# Patient Record
Sex: FEMALE | Race: WHITE | NOT HISPANIC OR LATINO | Employment: FULL TIME
[De-identification: names, ages, dates, MRNs, and addresses within clinical notes are randomized per-mention and may not be internally consistent; named-entity substitution may affect disease eponyms.]

---

## 2017-07-01 ENCOUNTER — HEALTH MAINTENANCE LETTER (OUTPATIENT)
Age: 57
End: 2017-07-01

## 2021-05-22 ENCOUNTER — OFFICE VISIT - HEALTHEAST (OUTPATIENT)
Dept: FAMILY MEDICINE | Facility: CLINIC | Age: 61
End: 2021-05-22

## 2021-05-22 DIAGNOSIS — R21 SKIN RASH: ICD-10-CM

## 2021-06-01 ENCOUNTER — OFFICE VISIT (OUTPATIENT)
Dept: ALLERGY | Facility: CLINIC | Age: 61
End: 2021-06-01
Payer: COMMERCIAL

## 2021-06-01 VITALS
OXYGEN SATURATION: 96 % | DIASTOLIC BLOOD PRESSURE: 105 MMHG | HEART RATE: 74 BPM | WEIGHT: 148.81 LBS | TEMPERATURE: 98.5 F | SYSTOLIC BLOOD PRESSURE: 146 MMHG

## 2021-06-01 DIAGNOSIS — L30.9 DERMATITIS: Primary | ICD-10-CM

## 2021-06-01 DIAGNOSIS — J30.0 VASOMOTOR RHINITIS: ICD-10-CM

## 2021-06-01 PROCEDURE — 86003 ALLG SPEC IGE CRUDE XTRC EA: CPT | Performed by: ALLERGY & IMMUNOLOGY

## 2021-06-01 PROCEDURE — 82785 ASSAY OF IGE: CPT | Performed by: ALLERGY & IMMUNOLOGY

## 2021-06-01 PROCEDURE — 99244 OFF/OP CNSLTJ NEW/EST MOD 40: CPT | Performed by: ALLERGY & IMMUNOLOGY

## 2021-06-01 PROCEDURE — 36415 COLL VENOUS BLD VENIPUNCTURE: CPT | Performed by: ALLERGY & IMMUNOLOGY

## 2021-06-01 RX ORDER — GINKGO BILOBA 40 MG
CAPSULE ORAL
COMMUNITY
Start: 2020-09-10

## 2021-06-01 RX ORDER — AZELASTINE 1 MG/ML
2 SPRAY, METERED NASAL 2 TIMES DAILY PRN
Qty: 30 ML | Refills: 3 | Status: SHIPPED | OUTPATIENT
Start: 2021-06-01

## 2021-06-01 SDOH — ECONOMIC STABILITY: FOOD INSECURITY: WITHIN THE PAST 12 MONTHS, YOU WORRIED THAT YOUR FOOD WOULD RUN OUT BEFORE YOU GOT THE MONEY TO BUY MORE.: NOT ASKED

## 2021-06-01 SDOH — ECONOMIC STABILITY: FOOD INSECURITY: HOW HARD IS IT FOR YOU TO PAY FOR THE VERY BASICS LIKE FOOD, HOUSING, MEDICAL CARE, AND HEATING?: NOT ASKED

## 2021-06-01 SDOH — ECONOMIC STABILITY: FOOD INSECURITY: WITHIN THE PAST 12 MONTHS, THE FOOD YOU BOUGHT JUST DIDN'T LAST AND YOU DIDN'T HAVE MONEY TO GET MORE.: NOT ASKED

## 2021-06-01 SDOH — ECONOMIC STABILITY: TRANSPORTATION INSECURITY
IN THE PAST 12 MONTHS, HAS LACK OF TRANSPORTATION KEPT YOU FROM MEETINGS, WORK, OR FROM GETTING THINGS NEEDED FOR DAILY LIVING?: NOT ASKED

## 2021-06-01 SDOH — ECONOMIC STABILITY: FOOD INSECURITY: WITHIN THE PAST 12 MONTHS, YOU WORRIED THAT YOUR FOOD WOULD RUN OUT BEFORE YOU GOT MONEY TO BUY MORE.: NOT ASKED

## 2021-06-01 SDOH — ECONOMIC STABILITY: TRANSPORTATION INSECURITY
IN THE PAST 12 MONTHS, HAS LACK OF TRANSPORTATION KEPT YOU FROM MEDICAL APPOINTMENTS OR FROM GETTING MEDICATIONS?: NOT ASKED

## 2021-06-01 SDOH — ECONOMIC STABILITY: INCOME INSECURITY: HOW HARD IS IT FOR YOU TO PAY FOR THE VERY BASICS LIKE FOOD, HOUSING, MEDICAL CARE, AND HEATING?: NOT ASKED

## 2021-06-01 SDOH — ECONOMIC STABILITY: TRANSPORTATION INSECURITY
IN THE PAST 12 MONTHS, HAS THE LACK OF TRANSPORTATION KEPT YOU FROM MEDICAL APPOINTMENTS OR FROM GETTING MEDICATIONS?: NOT ASKED

## 2021-06-01 ASSESSMENT — ENCOUNTER SYMPTOMS
EYE ITCHING: 0
JOINT SWELLING: 0
SINUS PRESSURE: 0
CHEST TIGHTNESS: 0
EYE REDNESS: 0
RHINORRHEA: 0
SHORTNESS OF BREATH: 0
FEVER: 0
EYE DISCHARGE: 0
MYALGIAS: 0
CHILLS: 0
NAUSEA: 0
VOMITING: 0
ADENOPATHY: 0
ARTHRALGIAS: 0
HEADACHES: 0
DIARRHEA: 0
WHEEZING: 0
FACIAL SWELLING: 0
ACTIVITY CHANGE: 0
COUGH: 0

## 2021-06-01 ASSESSMENT — ACTIVITIES OF DAILY LIVING (ADL): LACK_OF_TRANSPORTATION: NOT ASKED

## 2021-06-01 NOTE — PROGRESS NOTES
SUBJECTIVE:                                                                   Viridiana Payan is a 60-year-old female who presents today to our Allergy Clinic at [facility]; she is being seen at the request of Thao Lane CNP, for skin test evaluation.   The patient reports a history of seasonal rhinoconjunctivitis symptoms in Spring and Fall, manifested by nasal congestion, sneezing, clear rhinorrhea, and itchy/watery eyes.  She takes Claritin-D when her symptoms are bad enough.  That seems to be partially helpful those days.  Otherwise, she takes cetirizine, and it seems to help most of the time.  She used to develop bronchitis once a year, but after starting antihistamines in 1990s, she stopped having recurrent bronchitis.  In March, she developed a rash on her torso and lower extremities.  It was very pruritic.  2 days to get better.  She attributes the rash to polyester and a new comforter.  Replacing the comforter did not help because of containing polyester 2.  These days, she is rash-free, but she develops pruritus of the skin each time she touches anything that contains polyester, primarily clothes.  These days, she tries to wear cotton clothes only.      Patient Active Problem List   Diagnosis   (none) - all problems resolved or deleted       History reviewed. No pertinent past medical history.   Problem (# of Occurrences) Relation (Name,Age of Onset)    Allergies (1) Father: Seasonal    Chronic Obstructive Pulmonary Disease (1) Father        History reviewed. No pertinent surgical history.  Social History     Socioeconomic History     Marital status: Single     Spouse name: None     Number of children: None     Years of education: None     Highest education level: None   Occupational History     Employer: [employer]   Social LendingStandard     Financial resource strain: None     Food insecurity     Worry: None     Inability: None     Transportation needs     Medical: None      Non-medical: None   Tobacco Use     Smoking status: Former Smoker     Packs/day: 1.00     Types: Cigarettes     Quit date:      Years since quittin.4     Smokeless tobacco: Never Used   Substance and Sexual Activity     Alcohol use: None     Drug use: None     Sexual activity: None   Lifestyle     Physical activity     Days per week: None     Minutes per session: None     Stress: None   Relationships     Social connections     Talks on phone: None     Gets together: None     Attends Faith service: None     Active member of club or organization: None     Attends meetings of clubs or organizations: None     Relationship status: None     Intimate partner violence     Fear of current or ex partner: None     Emotionally abused: None     Physically abused: None     Forced sexual activity: None   Other Topics Concern     None   Social History Narrative    2021    ENVIRONMENTAL HISTORY: [place] The home is heated with a baseboard. They do have central air conditioning. The patient's bedroom is furnished with feather bedding or pillows, hard bettina in bedroom, fabric window coverings, and animals sleep in bedroom.  Pets inside the house include 1 dog(s). There is no history of cockroach or mice infestation. There is/are 0 smokers in the house.  The house does not have a damp basement.            Review of Systems   Constitutional: Negative for activity change, chills and fever.   HENT: Negative for congestion, dental problem, ear pain, facial swelling, nosebleeds, postnasal drip, rhinorrhea, sinus pressure and sneezing.    Eyes: Negative for discharge, redness and itching.   Respiratory: Negative for cough, chest tightness, shortness of breath and wheezing.    Cardiovascular: Negative for chest pain.   Gastrointestinal: Negative for diarrhea, nausea and vomiting.   Musculoskeletal: Negative for arthralgias, joint swelling and myalgias.   Skin: Negative for  rash.   Neurological: Negative for headaches.   Hematological: Negative for adenopathy.   Psychiatric/Behavioral: Negative for behavioral problems and self-injury.           Current Outpatient Medications:      azelastine (ASTELIN) 0.1 % nasal spray, Spray 2 sprays into both nostrils 2 times daily as needed for rhinitis, Disp: 30 mL, Rfl: 3     Ginkgo Biloba Extract 40 MG CAPS, , Disp: , Rfl:      Iodine, Kelp, (KELP PO), Take by mouth daily, Disp: , Rfl:      Multiple Vitamins-Minerals (HAIR VITAMINS PO), Take by mouth daily, Disp: , Rfl:      Omega-3 Fatty Acids (FISH OIL PO), Take by mouth daily, Disp: , Rfl:      TURMERIC PO, Take by mouth daily, Disp: , Rfl:   Immunization History   Administered Date(s) Administered     DTaP, Unspecified 10/10/2007, 08/31/2020     Allergies   Allergen Reactions     Clindamycin Hives     Codeine Itching     Fluticasone Other (See Comments)     Nose bleeds     Latex Other (See Comments)     Skin burning      Penicillins      Propoxyphene      Sulfa Drugs      OBJECTIVE:                                                                 BP (!) 146/105 (BP Location: Left arm, Patient Position: Sitting, Cuff Size: Adult Regular)   Pulse 74   Temp 98.5  F (36.9  C) (Tympanic)   Wt 67.5 kg (148 lb 13 oz)   SpO2 96%         Physical Exam  Vitals signs and nursing note reviewed.   Constitutional:       General: She is not in acute distress.     Appearance: She is not diaphoretic.   HENT:      Head: Normocephalic and atraumatic.      Right Ear: Tympanic membrane, ear canal and external ear normal.      Left Ear: Tympanic membrane, ear canal and external ear normal.      Nose: No mucosal edema or rhinorrhea.      Mouth/Throat:      Mouth: Mucous membranes are moist.      Pharynx: No oropharyngeal exudate.   Eyes:      General:         Right eye: No discharge.         Left eye: No discharge.      Conjunctiva/sclera: Conjunctivae normal.   Neck:      Musculoskeletal: Normal range of  motion.   Cardiovascular:      Rate and Rhythm: Normal rate and regular rhythm.      Heart sounds: Normal heart sounds. No murmur.   Pulmonary:      Effort: Pulmonary effort is normal. No respiratory distress.      Breath sounds: Normal breath sounds. No wheezing or rales.   Musculoskeletal: Normal range of motion.   Skin:     General: Skin is warm.      Findings: No rash.      Comments: Toe and fingernails are thickened/dystrophic.  Seems to be consistent with onychomycosis.   Neurological:      Mental Status: She is alert and oriented to person, place, and time.   Psychiatric:         Mood and Affect: Mood normal.         Behavior: Behavior normal.           ASSESSMENT/PLAN:    Dermatitis  The patient has some pictures of the rash.  The quality of the pictures is subpar, but looking and the pictures on her feet, it seems that the patient had contact dermatitis.  Other possibility is id reaction due to onychomycosis.  -I referred the patient to Dermatology for patch testing.  Recommend onychomycosis management by PCP, Dermatology, or Podiatry.   - ADULT DERMATOLOGY REFERRAL    Vasomotor rhinitis  Unable to perform SPT for aeroallergens today because the patient has not stopped oral antihistamines.  -Ordered serum IgE for regional aeroallergen panel.  -She can take cetirizine as needed, and add azelastine 2 sprays in each nostril twice daily as needed for breakthrough symptoms.    - IgE  - Allergen cat epithellium IgE  - Allergen dog epithelium IgE  - Allergen Dez grass IgE  - Allergen orchard grass IgE  - Allergen martha IgE  - Allergen D farinae IgE  - Allergen D pteronyssinus IgE  - Allergen alternaria alternata IgE  - Allergen aspergillus fumigatus IgE  - Allergen cladosporium herbarum IgE  - Allergen Epicoccum purpurascens IgE  - Allergen penicillium notatum IgE  - Allergen jd white IgE  - Allergen Cedar IgE  - Allergen cottonwood IgE  - Allergen elm IgE  - Allergen maple box elder IgE  - Allergen oak  white IgE  - Allergen Red [place] IgE  - Allergen silver  birch IgE  - Allergen Tree White [place] IgE  - Allergen [place] Tree  - Allergen white pine IgE  - Allergen English plantain IgE  - Allergen giant ragweed IgE  - Allergen lamb's quarter IgE  - Allergen Mugwort IgE  - Allergen ragweed short IgE  - Allergen Sagebrush Wormwood IgE  - Allergen Sheep Sorrel IgE  - Allergen thistle Russian IgE  - Allergen Weed Nettle IgE  - Allergen, Kochia/Firebush  - azelastine (ASTELIN) 0.1 % nasal spray  Dispense: 30 mL; Refill: 3       Return if symptoms worsen or fail to improve.    Thank you for allowing us to participate in the care of this patient. Please feel free to contact us if there are any questions or concerns about the patient.    Disclaimer: This note consists of symbols derived from keyboarding, dictation and/or voice recognition software. As a result, there may be errors in the script that have gone undetected. Please consider this when interpreting information found in this chart.    Karsten Santo MD, FAAAAI, FACAAI  Allergy, Asthma and Immunology    [facility]

## 2021-06-01 NOTE — PATIENT INSTRUCTIONS
For itch, try cetirizine 10 mg by mouth once-twice daily.    -Use azelastine 2 sprays in each nostril twice a day when necessary, for runny nose, nasal congestion, or sneezing, when cetirizine doesn't help.       See Dr. Posey.   [facility] Dr. Christian Posey [phone]

## 2021-06-01 NOTE — LETTER
6/1/2021         RE: Viridiana Payan  [address]  [place] [address]        Dear Colleague,    Thank you for referring your patient, Viridiana Payan, to the [facility]. Please see a copy of my visit note below.    SUBJECTIVE:                                                                   Viridiana Payan is a 60-year-old female who presents today to our Allergy Clinic at [facility]; she is being seen at the request of Thao Lane CNP, for skin test evaluation.   The patient reports a history of seasonal rhinoconjunctivitis symptoms in Spring and Fall, manifested by nasal congestion, sneezing, clear rhinorrhea, and itchy/watery eyes.  She takes Claritin-D when her symptoms are bad enough.  That seems to be partially helpful those days.  Otherwise, she takes cetirizine, and it seems to help most of the time.  She used to develop bronchitis once a year, but after starting antihistamines in 1990s, she stopped having recurrent bronchitis.  In March, she developed a rash on her torso and lower extremities.  It was very pruritic.  2 days to get better.  She attributes the rash to polyester and a new comforter.  Replacing the comforter did not help because of containing polyester 2.  These days, she is rash-free, but she develops pruritus of the skin each time she touches anything that contains polyester, primarily clothes.  These days, she tries to wear cotton clothes only.      Patient Active Problem List   Diagnosis   (none) - all problems resolved or deleted       History reviewed. No pertinent past medical history.   Problem (# of Occurrences) Relation (Name,Age of Onset)    Allergies (1) Father: Seasonal    Chronic Obstructive Pulmonary Disease (1) Father        History reviewed. No pertinent surgical history.  Social History     Socioeconomic History     Marital status: Single     Spouse name: None     Number of children: None     Years of  education: None     Highest education level: None   Occupational History     Employer: [employer]   Social Conjur     Financial resource strain: None     Food insecurity     Worry: None     Inability: None     Transportation needs     Medical: None     Non-medical: None   Tobacco Use     Smoking status: Former Smoker     Packs/day: 1.00     Types: Cigarettes     Quit date:      Years since quittin.4     Smokeless tobacco: Never Used   Substance and Sexual Activity     Alcohol use: None     Drug use: None     Sexual activity: None   Lifestyle     Physical activity     Days per week: None     Minutes per session: None     Stress: None   Relationships     Social connections     Talks on phone: None     Gets together: None     Attends Worship service: None     Active member of club or organization: None     Attends meetings of clubs or organizations: None     Relationship status: None     Intimate partner violence     Fear of current or ex partner: None     Emotionally abused: None     Physically abused: None     Forced sexual activity: None   Other Topics Concern     None   Social History Narrative    2021    ENVIRONMENTAL HISTORY: [place] The home is heated with a baseboard. They do have central air conditioning. The patient's bedroom is furnished with feather bedding or pillows, hard bettina in bedroom, fabric window coverings, and animals sleep in bedroom.  Pets inside the house include 1 dog(s). There is no history of cockroach or mice infestation. There is/are 0 smokers in the house.  The house does not have a damp basement.            Review of Systems   Constitutional: Negative for activity change, chills and fever.   HENT: Negative for congestion, dental problem, ear pain, facial swelling, nosebleeds, postnasal drip, rhinorrhea, sinus pressure and sneezing.    Eyes: Negative for discharge, redness and itching.   Respiratory: Negative for cough, chest  tightness, shortness of breath and wheezing.    Cardiovascular: Negative for chest pain.   Gastrointestinal: Negative for diarrhea, nausea and vomiting.   Musculoskeletal: Negative for arthralgias, joint swelling and myalgias.   Skin: Negative for rash.   Neurological: Negative for headaches.   Hematological: Negative for adenopathy.   Psychiatric/Behavioral: Negative for behavioral problems and self-injury.           Current Outpatient Medications:      azelastine (ASTELIN) 0.1 % nasal spray, Spray 2 sprays into both nostrils 2 times daily as needed for rhinitis, Disp: 30 mL, Rfl: 3     Ginkgo Biloba Extract 40 MG CAPS, , Disp: , Rfl:      Iodine, Kelp, (KELP PO), Take by mouth daily, Disp: , Rfl:      Multiple Vitamins-Minerals (HAIR VITAMINS PO), Take by mouth daily, Disp: , Rfl:      Omega-3 Fatty Acids (FISH OIL PO), Take by mouth daily, Disp: , Rfl:      TURMERIC PO, Take by mouth daily, Disp: , Rfl:   Immunization History   Administered Date(s) Administered     DTaP, Unspecified 10/10/2007, 08/31/2020     Allergies   Allergen Reactions     Clindamycin Hives     Codeine Itching     Fluticasone Other (See Comments)     Nose bleeds     Latex Other (See Comments)     Skin burning      Penicillins      Propoxyphene      Sulfa Drugs      OBJECTIVE:                                                                 BP (!) 146/105 (BP Location: Left arm, Patient Position: Sitting, Cuff Size: Adult Regular)   Pulse 74   Temp 98.5  F (36.9  C) (Tympanic)   Wt 67.5 kg (148 lb 13 oz)   SpO2 96%         Physical Exam  Vitals signs and nursing note reviewed.   Constitutional:       General: She is not in acute distress.     Appearance: She is not diaphoretic.   HENT:      Head: Normocephalic and atraumatic.      Right Ear: Tympanic membrane, ear canal and external ear normal.      Left Ear: Tympanic membrane, ear canal and external ear normal.      Nose: No mucosal edema or rhinorrhea.      Mouth/Throat:      Mouth:  Mucous membranes are moist.      Pharynx: No oropharyngeal exudate.   Eyes:      General:         Right eye: No discharge.         Left eye: No discharge.      Conjunctiva/sclera: Conjunctivae normal.   Neck:      Musculoskeletal: Normal range of motion.   Cardiovascular:      Rate and Rhythm: Normal rate and regular rhythm.      Heart sounds: Normal heart sounds. No murmur.   Pulmonary:      Effort: Pulmonary effort is normal. No respiratory distress.      Breath sounds: Normal breath sounds. No wheezing or rales.   Musculoskeletal: Normal range of motion.   Skin:     General: Skin is warm.      Findings: No rash.      Comments: Toe and fingernails are thickened/dystrophic.  Seems to be consistent with onychomycosis.   Neurological:      Mental Status: She is alert and oriented to person, place, and time.   Psychiatric:         Mood and Affect: Mood normal.         Behavior: Behavior normal.           ASSESSMENT/PLAN:    Dermatitis  The patient has some pictures of the rash.  The quality of the pictures is subpar, but looking and the pictures on her feet, it seems that the patient had contact dermatitis.  Other possibility is id reaction due to onychomycosis.  -I referred the patient to Dermatology for patch testing.  Recommend onychomycosis management by PCP, Dermatology, or Podiatry.   - ADULT DERMATOLOGY REFERRAL    Vasomotor rhinitis  Unable to perform SPT for aeroallergens today because the patient has not stopped oral antihistamines.  -Ordered serum IgE for regional aeroallergen panel.  -She can take cetirizine as needed, and add azelastine 2 sprays in each nostril twice daily as needed for breakthrough symptoms.    - IgE  - Allergen cat epithellium IgE  - Allergen dog epithelium IgE  - Allergen Dez grass IgE  - Allergen orchard grass IgE  - Allergen martha IgE  - Allergen D farinae IgE  - Allergen D pteronyssinus IgE  - Allergen alternaria alternata IgE  - Allergen aspergillus fumigatus IgE  - Allergen  cladosporium herbarum IgE  - Allergen Epicoccum purpurascens IgE  - Allergen penicillium notatum IgE  - Allergen jd white IgE  - Allergen Cedar IgE  - Allergen cottonwood IgE  - Allergen elm IgE  - Allergen maple box elder IgE  - Allergen oak white IgE  - Allergen Red [place] IgE  - Allergen silver  birch IgE  - Allergen Tree White [place] IgE  - Allergen [place] Tree  - Allergen white pine IgE  - Allergen English plantain IgE  - Allergen giant ragweed IgE  - Allergen lamb's quarter IgE  - Allergen Mugwort IgE  - Allergen ragweed short IgE  - Allergen Sagebrush Wormwood IgE  - Allergen Sheep Sorrel IgE  - Allergen thistle Russian IgE  - Allergen Weed Nettle IgE  - Allergen, Kochia/Firebush  - azelastine (ASTELIN) 0.1 % nasal spray  Dispense: 30 mL; Refill: 3       Return if symptoms worsen or fail to improve.    Thank you for allowing us to participate in the care of this patient. Please feel free to contact us if there are any questions or concerns about the patient.    Disclaimer: This note consists of symbols derived from keyboarding, dictation and/or voice recognition software. As a result, there may be errors in the script that have gone undetected. Please consider this when interpreting information found in this chart.    Karsten Santo MD, FAAAAI, FACAAI  Allergy, Asthma and Immunology    [facility]         Again, thank you for allowing me to participate in the care of your patient.        Sincerely,        Karsten Santo MD

## 2021-06-03 LAB
A ALTERNATA IGE QN: <0.1 KU(A)/L
A FUMIGATUS IGE QN: <0.1 KU(A)/L
C HERBARUM IGE QN: <0.1 KU(A)/L
CALIF WALNUT POLN IGE QN: <0.1 KU(A)/L
CAT DANDER IGG QN: <0.1 KU(A)/L
CEDAR IGE QN: <0.1 KU(A)/L
COCKSFOOT IGE QN: <0.1 KU(A)/L
COMMON RAGWEED IGE QN: <0.1 KU(A)/L
COTTONWOOD IGE QN: <0.1 KU(A)/L
D FARINAE IGE QN: <0.1 KU(A)/L
D PTERONYSS IGE QN: <0.1 KU(A)/L
DOG DANDER+EPITH IGE QN: <0.1 KU(A)/L
E PURPURASCENS IGE QN: <0.1 KU(A)/L
EAST WHITE PINE IGE QN: <0.1 KU(A)/L
ENGL PLANTAIN IGE QN: <0.1 KU(A)/L
FIREBUSH IGE QN: <0.1 KU(A)/L
GIANT RAGWEED IGE QN: <0.1 KU(A)/L
GOOSEFOOT IGE QN: <0.1 KU(A)/L
IGE SERPL-ACNC: 262 KIU/L (ref 0–114)
JOHNSON GRASS IGE QN: <0.1 KU(A)/L
MAPLE IGE QN: <0.1 KU(A)/L
MUGWORT IGE QN: <0.1 KU(A)/L
NETTLE IGE QN: <0.1 KU(A)/L
P NOTATUM IGE QN: <0.1 KU(A)/L
RED MULBERRY IGE QN: <0.1 KU(A)/L
SALTWORT IGE QN: <0.1 KU(A)/L
SHEEP SORREL IGE QN: <0.1 KU(A)/L
SILVER BIRCH IGE QN: <0.1 KU(A)/L
TIMOTHY IGE QN: <0.1 KU(A)/L
WHITE ASH IGE QN: <0.1 KU(A)/L
WHITE ELM IGE QN: <0.1 KU(A)/L
WHITE MULBERRY IGE QN: <0.1 KU(A)/L
WHITE OAK IGE QN: <0.1 KU(A)/L
WORMWOOD IGE QN: <0.1 KU(A)/L

## 2021-06-04 NOTE — RESULT ENCOUNTER NOTE
[employer] message sent:    The blood test was negative for tested environmental allergens.  We tested for cat, dog, dust mites, tree pollen, grass pollen, weed pollen, and molds.  Total immunoglobulin level is elevated, but it is not uncommon for patients with either environmental allergies or contact dermatitis or eczema, or asthma.  This is not a particular number that requires the treatment unless it is significantly elevated, more than several thousand.

## 2021-06-07 NOTE — TELEPHONE ENCOUNTER
FUTURE VISIT INFORMATION      FUTURE VISIT INFORMATION:    Date: 8.4.21    Time:  7:00    [place] [facility] [place]  REFERRAL INFORMATION:    Referring provider:  Dr. Karsten Santo    Referring providers clinic:  [facility] [place]    Reason for visit/diagnosis  Consult for Patch Testing - Referral & Records in [facility] - Also has Records in Care Everywhere from [facility] - Per Pt    RECORDS REQUESTED FROM:       Clinic name Comments Records Status Imaging Status   [facility] [place] 6.1.21  Dr. Santo [facility] na   [facility] 5.22.21  Thao Lane CNP [facility] na

## 2021-06-18 NOTE — PATIENT INSTRUCTIONS - HE
Patient Instructions by Thao Lane CNP at 5/22/2021 10:30 AM     Author: Thao Lane CNP Service: -- Author Type: Nurse Practitioner    Filed: 5/22/2021 11:16 AM Encounter Date: 5/22/2021 Status: Signed    : Thao Lane CNP (Nurse Practitioner)         Patient Education     Self-Care for Skin Rashes     Pat your skin dry. Do not rub.     When your skin reacts to a substance your body is sensitive to, it can cause a rash. You can treat most rashes at home by keeping the skin clean and dry. Many rashes may get better on their own within 2 to 3 days. You may need medical attention if your rash itches, drains, or hurts, particularly if the rash is getting worse.  What can cause a skin rash?    Sun poisoning, caused by too much exposure to the sun    An irritant or allergic reaction to a certain type of food, plant, or chemical, such as  shellfish, poison ivy, and or cleaning products    An infection caused by a fungus (ringworm), virus (chickenpox), or bacteria (strep)    Bites or infestation caused by insects or pests, such as ticks, lice, or mites    Dry skin, which is often seen during the winter months and in older people  How can I control itching and skin damage?    Take soothing lukewarm baths in a colloidal oatmeal product. You can buy this at the [employer].    Do your best not to scratch. Clip fingernails short, especially in young children, to reduce skin damage if scratching does occur.    Use moisturizing skin lotion instead of scratching your dry skin.    Use sunscreen whenever going out into direct sun.    Use only mild cleansing agents whenever possible.    Wash with mild, nonirritating soap and warm water.    Wear clothing that breathes, such as cotton shirts or canvas shoes.    If fluid is seeping from the rash, cover it loosely with clean gauze to absorb the discharge.    Many rashes are contagious. Prevent the rash from spreading to others by washing your hands often before or  after touching others with any skin rash.  Use medicine    Antihistamines such as diphenhydramine can help control itching. But use with caution because they can make you drowsy.    Using over-the-counter hydrocortisone cream on small rashes may help reduce swelling and itching    Most over-the-counter antifungal medicines can treat athletes foot and many other fungal infections of the skin.  Check with your healthcare provider  Call your healthcare provider if:    You were told that you have a fungal infection on your skin to make sure you have the correct type of medicine.    You have questions or concerns about medicines or their side effects.     Call 911  Call 911 if either of these occur:    Your tongue or lips start to swell    You have difficulty breathing      Call your healthcare provider  Call your healthcare provider if any of these occur:    Temperature of more than 101.0 F (38.3 C), or as directed    Sore throat, a cough, or unusual fatigue    Red, oozy, or painful rash gets worse. These are signs of infection.    Rash covers your face, genitals, or most of your body    Crusty sores or red rings that begin to spread    You were exposed to someone who has a contagious rash, such as scabies or lice.    Red bulls-eye rash with a white center (a sign of Lyme disease)    You were told that you have resistant bacteria (MRSA) on your skin.   Date Last Reviewed: 5/12/2015 2000-2016 [employer]. [address] [place] [address]. All rights reserved. This information is not intended as a substitute for professional medical care. Always follow your healthcare professional's instructions.    Patient having multiple episodes of rashes since March 24, 2021 and referred to Allergist to evaluate or longer

## 2021-06-30 NOTE — PROGRESS NOTES
"Progress Notes by Thao Lane CNP at 5/22/2021 10:30 AM     Author: Thao Lane CNP Service: -- Author Type: Nurse Practitioner    Filed: 5/22/2021 10:27 PM Encounter Date: 5/22/2021 Status: Signed    : Thao Lane CNP (Nurse Practitioner)       Chief Complaint   Patient presents with   ? OTHER CONCERNS     PT HAS SOME CONCERNS ABOUT AN ALLERGY TO \"POLYESTER\" ?      Clinical Decision Making:    Plan   Patient states since March 2021 she has been developing rashes and possibly longer despite taking her Zyrtec 10 mg daily      1. Skin rash  Ambulatory referral to Allergy [facility]       Patient Instructions       Patient Education     Self-Care for Skin Rashes     Pat your skin dry. Do not rub.     When your skin reacts to a substance your body is sensitive to, it can cause a rash. You can treat most rashes at home by keeping the skin clean and dry. Many rashes may get better on their own within 2 to 3 days. You may need medical attention if your rash itches, drains, or hurts, particularly if the rash is getting worse.  What can cause a skin rash?    Sun poisoning, caused by too much exposure to the sun    An irritant or allergic reaction to a certain type of food, plant, or chemical, such as  shellfish, poison ivy, and or cleaning products    An infection caused by a fungus (ringworm), virus (chickenpox), or bacteria (strep)    Bites or infestation caused by insects or pests, such as ticks, lice, or mites    Dry skin, which is often seen during the winter months and in older people  How can I control itching and skin damage?    Take soothing lukewarm baths in a colloidal oatmeal product. You can buy this at the [employer].    Do your best not to scratch. Clip fingernails short, especially in young children, to reduce skin damage if scratching does occur.    Use moisturizing skin lotion instead of scratching your dry skin.    Use sunscreen whenever going out into direct sun.    Use only mild " cleansing agents whenever possible.    Wash with mild, nonirritating soap and warm water.    Wear clothing that breathes, such as cotton shirts or canvas shoes.    If fluid is seeping from the rash, cover it loosely with clean gauze to absorb the discharge.    Many rashes are contagious. Prevent the rash from spreading to others by washing your hands often before or after touching others with any skin rash.  Use medicine    Antihistamines such as diphenhydramine can help control itching. But use with caution because they can make you drowsy.    Using over-the-counter hydrocortisone cream on small rashes may help reduce swelling and itching    Most over-the-counter antifungal medicines can treat athletes foot and many other fungal infections of the skin.  Check with your healthcare provider  Call your healthcare provider if:    You were told that you have a fungal infection on your skin to make sure you have the correct type of medicine.    You have questions or concerns about medicines or their side effects.     Call 911  Call 911 if either of these occur:    Your tongue or lips start to swell    You have difficulty breathing      Call your healthcare provider  Call your healthcare provider if any of these occur:    Temperature of more than 101.0 F (38.3 C), or as directed    Sore throat, a cough, or unusual fatigue    Red, oozy, or painful rash gets worse. These are signs of infection.    Rash covers your face, genitals, or most of your body    Crusty sores or red rings that begin to spread    You were exposed to someone who has a contagious rash, such as scabies or lice.    Red bulls-eye rash with a white center (a sign of Lyme disease)    You were told that you have resistant bacteria (MRSA) on your skin.   Date Last Reviewed: 5/12/2015 2000-2016 [employer]. [address] [place] [address], [place] [address]. All rights reserved. This information is not intended as a substitute for professional medical  care. Always follow your healthcare professional's instructions.    Patient having multiple episodes of rashes since March 24, 2021 and referred to Allergist to evaluate or longer           HPI:  Viridiana Payan is a 60 y.o. female who presents today complaining of having a rashes intermittent blotchy red raised rashes since March 24, 2021 and patient had multiple pictures on her phone.  Patient states she does not have a rash today.  Patient has been taking Zyrtec daily and this has no affect on her rashes.  Patient states she notes no certain foods, lotions, creams, laundry soap.  Patient thinks the cause of the rashes could be polyester.  Patient has not seen an allergist related to this rash but patient would like to see one for this.  Patient tried to make an appointment but couldn't get one.      History obtained from  the patient.    Problem List:  There are no relevant problems documented for this patient.    No past medical history on file.    Social History     Tobacco Use   ? Smoking status: Never Smoker   ? Smokeless tobacco: Never Used   Substance Use Topics   ? Alcohol use: Not on file       Review of Systems   Constitutional: Negative for chills and fever.   HENT: Negative for sore throat.    Respiratory: Negative for cough, shortness of breath and wheezing.    Cardiovascular: Negative for chest pain.   Gastrointestinal: Negative for diarrhea, nausea and vomiting.       Vitals:    05/22/21 1041   BP: 157/88   Patient Site: Left Arm   Patient Position: Sitting   Cuff Size: Adult Regular   Pulse: 76   Resp: 16   Temp: 98.8  F (37.1  C)   TempSrc: Oral   SpO2: 97%   Weight: 149 lb (67.6 kg)       Physical Exam  Vitals signs reviewed.   Constitutional:       Appearance: Normal appearance.   HENT:      Head: Normocephalic and atraumatic.      Right Ear: Tympanic membrane, ear canal and external ear normal.      Left Ear: Tympanic membrane, ear canal and external ear normal.      Nose: Nose normal.       Mouth/Throat:      Mouth: Mucous membranes are moist.      Pharynx: Oropharynx is clear.   Eyes:      Conjunctiva/sclera: Conjunctivae normal.   Cardiovascular:      Rate and Rhythm: Normal rate and regular rhythm.      Pulses: Normal pulses.      Heart sounds: Normal heart sounds.   Pulmonary:      Effort: Pulmonary effort is normal.      Breath sounds: Normal breath sounds.   Skin:     General: Skin is warm and dry.      Findings: No rash.   Neurological:      Mental Status: She is alert.   Psychiatric:         Mood and Affect: Mood normal.       Labs:  No results found for this or any previous visit (from the past 72 hour(s)).    Radiology:  I have personally ordered and preliminarily reviewed the following xray, I have noted  No results found.    At the end of the encounter, I discussed results, diagnosis, medications. Discussed red flags for immediate return to clinic/ER, as well as indications for follow up if no improvement. Patient understood and agreed to plan. Patient was stable for discharge.

## 2021-07-06 VITALS
WEIGHT: 149 LBS | TEMPERATURE: 98.8 F | SYSTOLIC BLOOD PRESSURE: 157 MMHG | DIASTOLIC BLOOD PRESSURE: 88 MMHG | OXYGEN SATURATION: 97 % | HEART RATE: 76 BPM | RESPIRATION RATE: 16 BRPM

## 2021-07-17 ENCOUNTER — HEALTH MAINTENANCE LETTER (OUTPATIENT)
Age: 61
End: 2021-07-17

## 2021-08-04 ENCOUNTER — PRE VISIT (OUTPATIENT)
Dept: ALLERGY | Facility: CLINIC | Age: 61
End: 2021-08-04

## 2021-08-04 ENCOUNTER — OFFICE VISIT (OUTPATIENT)
Dept: ALLERGY | Facility: CLINIC | Age: 61
End: 2021-08-04
Attending: ALLERGY & IMMUNOLOGY
Payer: COMMERCIAL

## 2021-08-04 DIAGNOSIS — J30.2 SEASONAL AND PERENNIAL ALLERGIC RHINOCONJUNCTIVITIS: ICD-10-CM

## 2021-08-04 DIAGNOSIS — Z84.89 FAMILY HISTORY OF ATOPY: Primary | ICD-10-CM

## 2021-08-04 DIAGNOSIS — L30.9 DERMATITIS: ICD-10-CM

## 2021-08-04 DIAGNOSIS — Z88.9 DRUG ALLERGY: ICD-10-CM

## 2021-08-04 DIAGNOSIS — H10.10 SEASONAL AND PERENNIAL ALLERGIC RHINOCONJUNCTIVITIS: ICD-10-CM

## 2021-08-04 DIAGNOSIS — J30.89 SEASONAL AND PERENNIAL ALLERGIC RHINOCONJUNCTIVITIS: ICD-10-CM

## 2021-08-04 DIAGNOSIS — L23.89 ALLERGIC CONTACT DERMATITIS DUE TO OTHER AGENTS: ICD-10-CM

## 2021-08-04 DIAGNOSIS — L60.3 TWENTY NAIL DYSTROPHY: ICD-10-CM

## 2021-08-04 DIAGNOSIS — L60.3 NAIL DYSTROPHY: ICD-10-CM

## 2021-08-04 PROCEDURE — 99204 OFFICE O/P NEW MOD 45 MIN: CPT | Performed by: DERMATOLOGY

## 2021-08-04 NOTE — PATIENT INSTRUCTIONS
Dry Skin    What is dry skin?    Common skin problem    Can be worse during the winter     Affects all ages    Occurs in people with or without other skin problems    What does it look like?    Fine lines in the skin become more visible     Rough feeling skin     Flaky skin    Most common on the arms and legs    Skin can become cracked, especially on the hands and feet    What are some problems caused by dry skin?     Itching    Rubbing or scratching can cause thickened, rough skin patches    Cracks in skin can be painful    Red, itchy, scaly skin (called eczema) can occur    Yellow crusting or pus could be signs of an infection    What causes dry skin?    A lack of water in the top layer of the skin    Too much soapy water,  hot water, or harsh chemicals    Aging and sun damage    How do I treat dry skin?    Shower or bathe daily for under ten minutes with lukewarm water and mild soap.    Pat yourself dry with a towel gently and leave your skin slightly damp.    Use moisturizing cream or ointment right away.  Avoid lotions.    What kind of mild soap should I be using?    Camay , Dove , Tone , Neutrogena , Purpose , or Oil of Olay     A non-detergent cleanser, like Cetaphil , can be used.    What should I stay away from?    Scented soaps     Bath oils    What moisturizers should I be using?    Cetaphil Cream,CeraVe Cream, Vanicream, Aquaphilic, Eucerin, Aquaphor, or Vaseline     Always apply after showering or bathing.    Reapply throughout the day, if possible.    If dry skin affects your hands, always reapply after handwashing.    What else should I know?    Using a humidifier during winter months may help.    If dry skin gets worse or if eczema develops, a steroid cream may be needed.

## 2021-08-04 NOTE — LETTER
8/4/2021         RE: Viridiana Payan  [address] [address] [place] [address]        Dear Colleague,    Thank you for referring your patient, Viridiana Payan, to the [facility] [place]. Please see a copy of my visit note below.    [facility] Dermato-allergology Note  Office visit  Encounter Date: Aug 4, 2021  ____________________________________________    CC: No chief complaint on file.      HPI:  Ms. Viridiana Payan is a(n) 60 year old female who presents today as a new patient for allergy consultation  - Pruritic rash March 2021, back, feet, hands d/t new polyester comforter per patient. ~3 days. Patient has photos of rash,   - Seasonal allergies for >30 years, remote history of chronic bronchitis   - Seen by Dr. Santo 6/1/2021 and negative blood test   - Not on any allergy treatments. Used Claritin and Flonase in the past >5 years. No longer using them. Start in late summer, end mid-fall. Runny nose and congestion coughing. Worse at night.   - Chronic onychomycosis - fingers and toenails. Notes many fungal infections. Cultures were negative per visit with Nisha. Took po fluconazole for 3 years that did not clear it + nail picking.   - otherwise feeling well in usual state of health  - No history of asthma. No atopic dermatitis. No family history.   - Use all free and clear products. Using pacifica and lancome makeup brands.     >> some seasonal Bronchitis and improvement on Claritine = 20 years ago sensitization to ragweed, dust, dogs    Physical exam:  General: In no acute distress, well-developed, well-nourished  Eyes: no conjunctivitis  ENT: no signs of rhinitis   Pulmonary: no wheezing or coughing  Skin: seen pictures in phone --> over the toes eczematous lesions dorsal to base and itchy, eczematous lesions on the back ==> worst in March    Past Medical History:   Patient Active Problem List   Diagnosis   (none) - all problems resolved or deleted     Past  Medical History:   Diagnosis Date     ADHD (attention deficit hyperactivity disorder)      Allergic rhinitis      Asthma      Chemical dependency (H)     age 16     Depressive disorder      Knee injury     Work comp     Migraine      Smoker 10/2007    1ppd     Suicide attempt (H)     2 times with pills     Uterine fibroids affecting pregnancy        Allergies:  Allergies   Allergen Reactions     Clindamycin Hives     Codeine Itching     Fluticasone Other (See Comments)     Nose bleeds     Latex Other (See Comments)     Skin burning      Penicillins      Propoxyphene      Sulfa Drugs        Medications:  Current Outpatient Medications   Medication     azelastine (ASTELIN) 0.1 % nasal spray     Ginkgo Biloba Extract 40 MG CAPS     Iodine, Kelp, (KELP PO)     Multiple Vitamins-Minerals (HAIR VITAMINS PO)     Omega-3 Fatty Acids (FISH OIL PO)     TURMERIC PO     No current facility-administered medications for this visit.       Social History:  The patient works at home at a [employer] center. Patient has the following hobbies or non-occupational exposure: none.     Family History:  Family History   Problem Relation Age of Onset     Chronic Obstructive Pulmonary Disease Father      Allergies Father         Seasonal     Breast Cancer Mother      Skin Cancer Brother      Rheumatoid Arthritis Maternal Aunt      Heart Disease Paternal Uncle      Bone Cancer Paternal Grandfather        Previous Labs, Allergy Tests, Dermatopathology, Imaging:      Referred By: Karsten Santo MD  [address] [place] [id number]     Allergy Tests:    Past Allergy Test    Order for Future Allergy Testing:    [x] Outpatient  [] Inpatient: [place]..../ Bed ....       Skin Atopy (atopic dermatitis) [] Yes   [x] No .........  Contact allergies:   [] Yes   [x] No ..........  Hand eczema:   [] Yes   [] No           Leading hand:   [] R   [] L       [] Ambidextrous         Drug allergies:        [] Yes   [x] No  Which?.Hives to Penicillins and  Clindamycin    Urticaria/Angioedema  [] Yes   [x] No .........  Food Allergy:  [] Yes   [x] No  which?......  Pets :  [x] Yes   [] No  Which?..[place]ou dog         [x]  Rhinitis   [x] Conjunctivitis   [] Sinusitis   [] Polyposis   [] Otitis   [] Pharyngitis         []  none  Operations:   [] Tonsils   [] Septum   [] Sinus   [] Polyposis        [x] Asthma bronchiale/Bronchitis   [] Coughing      []  none  Symptoms (mostly Rhinoconjunctivitis and Asthma) aggravated by:  Season   [] I   [] II   [] III   [] IV   []V   []VI   []VII   []VIII   [x]IX   [x]X   []XI   []XI     [x] perennial Bronchitis  Day time      [] morning   [] noon      [] evening        [] night    [] whole day........  []  none  Location/changes    [] inside        [] outside   [] mountains    [] sea     [] others.............   []  none  Triggers, specific     [] animals     [] plants     [] dust              [] others ...........................    []  none  Triggers, others       [] work          [] psyche    [] sport            [] others .............................  []  none  Irritant                [] phys efforts [] smoke    [] heat/cold     [] odors  []others............... []  none    Order for PATCH TESTS  Reason for tests (suspected allergy): recurrent dermatitis  Known previous allergies: none  Standardized panels  [x] Standard panel (40 tests)  [x] Preservatives & Antimicrobials (31 tests)  [x] Emulsifiers & Additives (25 tests)   [x] Perfumes/Flavours & Plants (25 tests)  [] Hairdresser panel (12 tests)  [x] Rubber Chemicals (22 tests)  [x] Plastics (26 tests)  [] Colorants/Dyes/Food additives (20 tests)  [] Metals (implants/dental) (24 tests)  [] Local anaesthetics/NSAIDs (13 tests)  [x] Antibiotics & Antimycotics (14 tests)   [] Corticosteroids (15 tests)   [] Photopatch test (62 tests)   [] others: ...      [] Patient's own products: ...    DO NOT test if chemical or biological identity is unknown!     always ask from patient the  product information and safety sheets (MSDS)       Order for PRICK TESTS    Reason for tests (suspected allergy): seasonal and probably perennial Rhinitis and Bronchitis  Known previous allergies: HDM and ragweed    Standardized prick panels  [x] Atopic panel (20 tests)  [] Pediatric Panel (12 tests)  [] Milk, Meat, Eggs, Grains (20 tests)   [] Dust, Epithelia, Feathers (10 tests)  [] Fish, Seafood, Shellfish (17 tests)  [] Nuts, Beans (14 tests)  [] Spice, Vegetable, Fruit (17 tests)  [] Pollen Panel = Tree, Grass, Weed (24 tests)  [] Others: ...      [] Patient's own products: ...    DO NOT test if chemical or biological identity is unknown!     always ask from patient the product information and safety sheets (MSDS)     Standardized intradermal tests  [x] Penicillium notatum [x] Aspergillus fumigatus [x] House dust mites D.far & D. pteron  [] Cat    [x] dog  [] Others: ...  [] Bee venom   [] Wasp venom  !!Specific protocol with dilutions!!       Order for Drug allergy tests (prick & Intradermal &  patch tests)    [x] Penicillin G  [x] Ampicillin [] Cefazolin   [] Ceftriaxone   [] Ceftazidime  [x] Bactrim    [x] Others: Clindamycine  Order for ... as test date    [] Patient needs consultation with Allergy team (changes of tests may apply)  [x] Tests discussed with Allergy team (can have direct appointment for allergy tests)     ________________________________    Assessment & Plan:    ==> Final Diagnosis:     # atopic predisposition with    Seasonal RC in fall    Possible perennial RC and Bronchitis with morning sneezing    Recurrent dermatitis on feet and trunk DDx atopic dermatitis  * chronic illness with exacerbation, progression, side effects from treatment      # suspicion for allergic contact dermatitis on feet and maybe finger and trunk  * chronic illness with exacerbation, progression, side effects from treatment    # nail dystrophy (20 nail syndrome) DDx Lichen planus or allergic contact dermatitis  *  chronic illness with exacerbation, progression, side effects from treatment     >> explained to patient that there is no direct contra-indication against the COVID vaccines, incl PEG      These conclusions are made at the best of one's knowledge and belief based on the provided evidence such as patient's history and allergy test results and they can change over time or can be incomplete because of missing information's.    ==> Treatment Plan:  Plan allergy tests  Patient should do COVID vaccine ASAP      Staff:  Provider    Follow-up in Derm-Allergy clinic for allergy tests    I spent a total of 40 minutes with Viridiana Payan. This time was spent counseling the patient and/or coordinating care, explaining the allergy tests       Again, thank you for allowing me to participate in the care of your patient.        Sincerely,        Christian Posey MD

## 2021-08-04 NOTE — PROGRESS NOTES
[facility] Dermato-allergology Note  Office visit  Encounter Date: Aug 4, 2021  ____________________________________________    CC: No chief complaint on file.      HPI:  Ms. Viridiana Payan is a(n) 60 year old female who presents today as a new patient for allergy consultation  - Pruritic rash March 2021, back, feet, hands d/t new polyester comforter per patient. ~3 days. Patient has photos of rash,   - Seasonal allergies for >30 years, remote history of chronic bronchitis   - Seen by Dr. Santo 6/1/2021 and negative blood test   - Not on any allergy treatments. Used Claritin and Flonase in the past >5 years. No longer using them. Start in late summer, end mid-fall. Runny nose and congestion coughing. Worse at night.   - Chronic onychomycosis - fingers and toenails. Notes many fungal infections. Cultures were negative per visit with Nisha. Took po fluconazole for 3 years that did not clear it + nail picking.   - otherwise feeling well in usual state of health  - No history of asthma. No atopic dermatitis. No family history.   - Use all free and clear products. Using pacifica and lancome makeup brands.     >> some seasonal Bronchitis and improvement on Claritine = 20 years ago sensitization to ragweed, dust, dogs    Physical exam:  General: In no acute distress, well-developed, well-nourished  Eyes: no conjunctivitis  ENT: no signs of rhinitis   Pulmonary: no wheezing or coughing  Skin: seen pictures in phone --> over the toes eczematous lesions dorsal to base and itchy, eczematous lesions on the back ==> worst in March    Past Medical History:   Patient Active Problem List   Diagnosis   (none) - all problems resolved or deleted     Past Medical History:   Diagnosis Date     ADHD (attention deficit hyperactivity disorder)      Allergic rhinitis      Asthma      Chemical dependency (H)     age 16     Depressive disorder      Knee injury     Work comp     Migraine      Smoker 10/2007    1ppd      Suicide attempt (H)     2 times with pills     Uterine fibroids affecting pregnancy        Allergies:  Allergies   Allergen Reactions     Clindamycin Hives     Codeine Itching     Fluticasone Other (See Comments)     Nose bleeds     Latex Other (See Comments)     Skin burning      Penicillins      Propoxyphene      Sulfa Drugs        Medications:  Current Outpatient Medications   Medication     azelastine (ASTELIN) 0.1 % nasal spray     Ginkgo Biloba Extract 40 MG CAPS     Iodine, Kelp, (KELP PO)     Multiple Vitamins-Minerals (HAIR VITAMINS PO)     Omega-3 Fatty Acids (FISH OIL PO)     TURMERIC PO     No current facility-administered medications for this visit.       Social History:  The patient works at home at a [employer]. Patient has the following hobbies or non-occupational exposure: none.     Family History:  Family History   Problem Relation Age of Onset     Chronic Obstructive Pulmonary Disease Father      Allergies Father         Seasonal     Breast Cancer Mother      Skin Cancer Brother      Rheumatoid Arthritis Maternal Aunt      Heart Disease Paternal Uncle      Bone Cancer Paternal Grandfather        Previous Labs, Allergy Tests, Dermatopathology, Imaging:      Referred By: Karsten Santo MD  [address] [place] [address]     Allergy Tests:    Past Allergy Test    Order for Future Allergy Testing:    [x] Outpatient  [] Inpatient: [place]..../ Bed ....       Skin Atopy (atopic dermatitis) [] Yes   [x] No .........  Contact allergies:   [] Yes   [x] No ..........  Hand eczema:   [] Yes   [] No           Leading hand:   [] R   [] L       [] Ambidextrous         Drug allergies:        [] Yes   [x] No  Which?.Hives to Penicillins and Clindamycin    Urticaria/Angioedema  [] Yes   [x] No .........  Food Allergy:  [] Yes   [x] No  which?......  Pets :  [x] Yes   [] No  Which?..Chihuahua dog         [x]  Rhinitis   [x] Conjunctivitis   [] Sinusitis   [] Polyposis   [] Otitis   [] Pharyngitis         []   none  Operations:   [] Tonsils   [] Septum   [] Sinus   [] Polyposis        [x] Asthma bronchiale/Bronchitis   [] Coughing      []  none  Symptoms (mostly Rhinoconjunctivitis and Asthma) aggravated by:  Season   [] I   [] II   [] III   [] IV   []V   []VI   []VII   []VIII   [x]IX   [x]X   []XI   []XI     [x] perennial Bronchitis  Day time      [] morning   [] noon      [] evening        [] night    [] whole day........  []  none  Location/changes    [] inside        [] outside   [] mountains    [] sea     [] others.............   []  none  Triggers, specific     [] animals     [] plants     [] dust              [] others ...........................    []  none  Triggers, others       [] work          [] psyche    [] sport            [] others .............................  []  none  Irritant                [] phys efforts [] smoke    [] heat/cold     [] odors  []others............... []  none    Order for PATCH TESTS  Reason for tests (suspected allergy): recurrent dermatitis  Known previous allergies: none  Standardized panels  [x] Standard panel (40 tests)  [x] Preservatives & Antimicrobials (31 tests)  [x] Emulsifiers & Additives (25 tests)   [x] Perfumes/Flavours & Plants (25 tests)  [] Hairdresser panel (12 tests)  [x] Rubber Chemicals (22 tests)  [x] Plastics (26 tests)  [] Colorants/Dyes/Food additives (20 tests)  [] Metals (implants/dental) (24 tests)  [] Local anaesthetics/NSAIDs (13 tests)  [x] Antibiotics & Antimycotics (14 tests)   [] Corticosteroids (15 tests)   [] Photopatch test (62 tests)   [] others: ...      [] Patient's own products: ...    DO NOT test if chemical or biological identity is unknown!     always ask from patient the product information and safety sheets (MSDS)       Order for PRICK TESTS    Reason for tests (suspected allergy): seasonal and probably perennial Rhinitis and Bronchitis  Known previous allergies: HDM and ragweed    Standardized prick panels  [x] Atopic panel (20  tests)  [] Pediatric Panel (12 tests)  [] Milk, Meat, Eggs, Grains (20 tests)   [] Dust, Epithelia, Feathers (10 tests)  [] Fish, Seafood, Shellfish (17 tests)  [] Nuts, Beans (14 tests)  [] Spice, Vegetable, Fruit (17 tests)  [] Pollen Panel = Tree, Grass, Weed (24 tests)  [] Others: ...      [] Patient's own products: ...    DO NOT test if chemical or biological identity is unknown!     always ask from patient the product information and safety sheets (MSDS)     Standardized intradermal tests  [x] Penicillium notatum [x] Aspergillus fumigatus [x] House dust mites D.far & D. pteron  [] Cat    [x] dog  [] Others: ...  [] Bee venom   [] Wasp venom  !!Specific protocol with dilutions!!       Order for Drug allergy tests (prick & Intradermal &  patch tests)    [x] Penicillin G  [x] Ampicillin [] Cefazolin   [] Ceftriaxone   [] Ceftazidime  [x] Bactrim    [x] Others: Clindamycine  Order for ... as test date    [] Patient needs consultation with Allergy team (changes of tests may apply)  [x] Tests discussed with Allergy team (can have direct appointment for allergy tests)     ________________________________    Assessment & Plan:    ==> Final Diagnosis:     # atopic predisposition with    Seasonal RC in fall    Possible perennial RC and Bronchitis with morning sneezing    Recurrent dermatitis on feet and trunk DDx atopic dermatitis  * chronic illness with exacerbation, progression, side effects from treatment      # suspicion for allergic contact dermatitis on feet and maybe finger and trunk  * chronic illness with exacerbation, progression, side effects from treatment    # 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    ==> Treatment Plan:  Plan allergy tests  Patient should do COVID vaccine ASAP      Staff:  Provider    Follow-up in Derm-Allergy clinic for allergy tests    I spent a total of 40 minutes with Viridiana Payan. This time was spent counseling the patient and/or coordinating care, explaining the allergy tests

## 2021-08-04 NOTE — NURSING NOTE
Chief Complaint   Patient presents with     Allergy Consult     Patient states that she is here today for a consult about patch testing.     Rachel Oneill LPN

## 2021-09-11 ENCOUNTER — HEALTH MAINTENANCE LETTER (OUTPATIENT)
Age: 61
End: 2021-09-11

## 2021-10-14 ENCOUNTER — TELEPHONE (OUTPATIENT)
Dept: DERMATOLOGY | Facility: CLINIC | Age: 61
End: 2021-10-14

## 2021-10-14 NOTE — TELEPHONE ENCOUNTER
Order for PATCH TESTS  Reason for tests (suspected allergy): recurrent dermatitis  Known previous allergies: none  Standardized panels  [x]? Standard panel (40 tests)  [x]? Preservatives & Antimicrobials (31 tests)  [x]? Emulsifiers & Additives (25 tests)   [x]? Perfumes/Flavours & Plants (25 tests)  []? Hairdresser panel (12 tests)  [x]? Rubber Chemicals (22 tests)  [x]? Plastics (26 tests)  []? Colorants/Dyes/Food additives (20 tests)  []? Metals (implants/dental) (24 tests)  []? Local anaesthetics/NSAIDs (13 tests)  [x]? Antibiotics & Antimycotics (14 tests)   []? Corticosteroids (15 tests)   []? Photopatch test (62 tests)   []? others: ...                         []? Patient's own products: ...    DO NOT test if chemical or biological identity is unknown!     always ask from patient the product information and safety sheets (MSDS)         Order for PRICK TESTS     Reason for tests (suspected allergy): seasonal and probably perennial Rhinitis and Bronchitis  Known previous allergies: HDM and ragweed     Standardized prick panels  [x]? Atopic panel (20 tests)  []? Pediatric Panel (12 tests)  []? Milk, Meat, Eggs, Grains (20 tests)   []? Dust, Epithelia, Feathers (10 tests)  []? Fish, Seafood, Shellfish (17 tests)  []? Nuts, Beans (14 tests)  []? Spice, Vegetable, Fruit (17 tests)  []? Pollen Panel = Tree, Grass, Weed (24 tests)  []? Others: ...                         []? Patient's own products: ...    DO NOT test if chemical or biological identity is unknown!     always ask from patient the product information and safety sheets (MSDS)      Standardized intradermal tests  [x]? Penicillium notatum           [x]? Aspergillus fumigatus        [x]? House dust mites D.far & D. pteron  []? Cat                                      [x]? dog             []? Others: ...  []? Bee venom   []? Wasp venom  !!Specific protocol with dilutions!!         Order for Drug allergy tests (prick & Intradermal &  patch tests)     [x]?  Penicillin G             [x]? Ampicillin    []? Cefazolin   []? Ceftriaxone   []? Ceftazidime  [x]? Bactrim         [x]? Others: Clindamycine  Order for ... as test date     []? Patient needs consultation with Allergy team (changes of tests may apply)  [x]? Tests discussed with Allergy team (can have direct appointment for allergy tests)     STANDARD Series                                          # Substance 2 days 4 days remarks     1 Fahad Mix [C] - -       2 Colophony - -       3  2-Mercaptobenzothiazole  - -       4 Methylisothiazolinone - -       5 Carba Mix - -       6 Thiuram Mix [A] - -       7 Bisphenol A Epoxy Resin - -       8 P-Lalh-Oeyyditupth-Formaldehyde Resin - -       9 Mercapto Mix [A] - -       10 Black Rubber Mix- PPD [B] - -       11 Potassium Dichromate  -  -       12 Balsam of [place] (Myroxylon Pereirae Resin) - -       13 Nickel Sulphate Hexahydrate - -       14 Mixed Dialkyl Thiourea - -       15 Paraben Mix [B] - -       16 Methyldibromo Glutaronitrile - -       17 Fragrance Mix - -       18 2-Bromo-2-Nitropropane-1,3-Diol (Bronopol) CT - -       19 Lyral - -       20 Tixocortol-21- Pivalate CT - -       21 Diazolidiyl Urea (Germall II) - -       22 Methyl Methacrylate - -       23 Cobalt (II) Chloride Hexahydrate - -       24 Fragrance Mix II  - -       25 Compositae Mix - -       26 Benzoyl Peroxide - -       27 Bacitracin - -       28 Formaldehyde - -       29 Methylchloroisothiazolinone / Methylisothiazolinone - -       30 Corticosteroid Mix CT - -       31 Sodium Lauryl Sulfate - -       32 Lanolin Alcohol - -       33 Turpentine - -       34 Cetylstearylalcohol - -       35 Chlorhexidine Dicluconate - -       36 Budenoside - -       37 Imidazolidinyl Urea  - -       38 Ethyl-2 Cyanoacrylate - -       39 Quaternium 15 (Dowicil 200) - -       40 Decyl Glucoside - -       PRESERVATIVES & ANTIMICROBIALS        # Substance 2 days 4 days remarks   41 1  1,2-Benzisothiazoline-3-One, Sodium  Salt - -     2  1,3,5-Alok (2-Hydroxyethyl) - Hexahydrotriazine (Grotan BK) - -     3 8-Fduuiomqewpwq-0-Nitro-1, 3-Propanediol - -     4  3, 4, 4' - Triclocarban - -    45 5 4 - Chloro - 3 - Cresol - -     6 4 - Chloro - 4 - Xylenol (PCMX) - -     7 7-Ethylbicyclooxazolidine (Bioban EZ0381) - -     8 Benzalkonium Chloride CT - -     9 Benzyl Alcohol - -    50 10 Cetalkonium Chloride - -     11 Cetylpyrimidine Chloride  - -     12 Chloroacetamide - -     13 DMDM Hydantoin - -     14 Glutaraldehyde - -    55 15 Triclosan - -     16 Glyoxal Trimeric Dihydrate - -     17 Iodopropynyl Butylcarbamate - -     18 Octylisothiazoline - -     19 Bithionol CT - -    60 20 Bioban P 1487 (Nitrobutyl) Morpholine/(Ethylnitro-Trimethylene) Dimorpholine - -     21 Phenoxyethanol - -     22 Phenyl Salicylate - -     23 Povidone Iodine - -     24 Sodium Benzoate - -    65 25 Sodium Disulfite - -     26 Sorbic Acid - -     27 Thimerosal       28 Melamine Formaldehyde Resin       29 Ethylenediamine Dihydrochloride        Parabens      70 30 Butyl-P-Hydroxybenzoate - -     31 Ethyl-P-Hydroxybenzoate - -     32 Methyl-P-Hydroxybenzoate - -    73 33 Propyl-P-Hydroxybenzoate - -      EMULSIFIERS & ADDITIVES       # Substance 2 days 4 days remarks   74 1 Polyethylene Glycol-400 - -    75 2 Cocamidopropyl Betaine - -     3 Amerchol L101 - -     4 Propylene Glycol - -     5 Triethanolamine - -     6 Sorbitane Sesquiolate CT - -    80 7 Isopropylmyristate - -     8 Polysorbate 80 CT - -     9 Amidoamine   (Stearamidopropyl Dimethylamine) - -     10 Oleamidopropyl Dimethylamine - -     11 Lauryl Glucoside - -    85 12 Coconut Diethanolamide  - -     13 2-Hydroxy-4-Methoxy Benzophenone (Oxybenzone) - -     14 Benzophenone-4 (Sulisobenzon) - -     15 Propolis - -     16 Dexpanthenol - -    90 17 Carboxymethyl Cellulose Sodium - -     18 Abitol - -     19 Tert-Butylhydroquinone - -     20 Benzyl Salicylate - -     21 Dimethylaminopropylamin (DMPA)  CT? D053      95 22 Zinc Pyrithione (Zinc Omadine) CT? Z006       23 Alok(Hydroxymethyl) Nitromethane CT        Antioxidant - -     24 Dodecyl Gallate - -     25 Butylhydroxyanisole (BHA) - -     26 Butylhydroxytoluene (BHT) - -    100 27 Di-Alpha-Tocopherol (Vit E) - -    101 28 Propyl Gallate - -      PERFUMES, FLAVORS & PLANTS        # Substance 2 days 4 days remarks   102 1 Benzyl Cinnamate - -     2 Di-Limonene (Dipentene) - -     3 Cananga Odorata (Collin Polanco) (I) - -    105 4 Lichen Acid Mix - -     5 Mentha Piperita Oil (Peppermint Oil) - -     6 Sesquiterpenelactone mix - -     7 Tea Tree Oil, Oxidized - -     8 Wood Tar Mix - -    110 9 Abietic Acid - -     10 Lavendula Angustifolia Oil (Lavender Oil) - -     11 Fragrance mix II CT * - -      Fragrance Mix I      125 12 [place]moss Absolute - -     13 Eugenol - -    115 14 Geraniol - -     15 Hydroxycitronellal - -     16 Isoeugenol - -     17 Cinnamic Aldehyde - -     18 Cinnamic Alcohol  - -      Fragrance mix II      120 19 Citronellol - -     20 Alpha-Hexylcinnamic Aldehyde    - -     21 Citral - -     22 Farnesol - -    124 23 Coumarin - -      Hexylcinnamic aldehyde, Coumarin, Farnesol, Hydroxyisohexy3-cyclohexene carboxaldehyde, citral, citrolellol  RUBBER CHEMICALS        # Substance 2 days 4 days remarks     Carbamate      125 1 Zinc Bis ( Diethyldithio carbamate ) (ZDEC) - -     2 Zinc Bis (Dibutyldithiocarbamate) - -     3 1,3-Diphenylguanidine (DPG) - -      Thiourea       4 Dibutylthiourea - -     5 Diphenyltiourea - -    130 6 Thiourea - -      Mercapto chemicals       7 Morpholinyl Mercaptobenzothiazole - -     8 L-Promqpzfbn-2-Benzothiazyl-Sulfenamide - -     9 Dibenzothiazyl Disulfide - -      Thiuram chemicals       10 Dipentamethylenethiuram Disulfide - -    135 11 Tetraethylthiuram Disulfide (Disulfiram) - -     12 Tetramethylthiuram Disulfide - -     13 Tetramethyl Thiuram Monosulfide (TMTM) - -      4-Phenylenediamine derivatives        14 N-Isopropyl-N'-Phenyl-P-Phenylenediamine (IPPD) - -     15 Noicjsrn-Q-Fantjavurhegyhpv (DPPD) - -      Various Rubber Additives      140 16 Hydroquinone Monobenzylether  - -     17 Hexamethylenetetramine - -     18 4,4'-Dihydroxybiphenyl - -     19 Cyclohexylthiophtalimide - -     20 N-Phenyl-B-Naphthylamine - -    145 21 Dodecyl Mercaptan - -        PLASTICS        # Substance 2 days 4 days remarks     Acrylates - -     1 2-Hydroxyethyl Methacrylate (HEMA) - -     2 1,4-Butandioldimethacrylate (BUDMA) - -     3  2-Ethylhexyl Acrylate - -     4 Bisphenol-A-Dimethacrylate  - -    150 5 Diurethane-Dimethacrylate - -     6 Ethyleneglycoldimethacrylate (EGDMA) - -     7 Pentaerythritoltriacrylate (TYLER) - -     8 Triethylene Glycol Dimethacrylate (TEGDMA) - -      Synthetic material/additives        9 F-Yxxk-Mujkmzacxyk - -    155 10 Tricresyl Phosphate - -     11 9-Spde-Tjrmmdvauqfey - -     12 Bis (2-Ethylhexyl) Phthalate - -     13 Dibutylphthalate - -     14 Dimethylphthalate - -    160 15 Toluene-2,4-Diisocyanate - -     16 Diphenylmethane-4,4''-Diisocyanate - -      EPOXY RESIN SYSTEMS        Reactive Solvents - -     17 Cresyl Glycidyl Ether - -     18 Butyl Glycidyl Ether - -     19 Phenyl Glycidyl Ether - -    165 20 1,4-Butanediol Diglycidyl Ether - -     21 1,6-Hexanediole Diglycidyl Ether - -      Hardener / Accelerator - -     22 Triethylenetetramine - -     23 Diethylenetriamine - -     24 Isophorone Diamine (IPD) - -    170 25 N,N-Dimethyl-P-Toluidine - -     26 9-wzka-Vqzlvmqcgzmlh (antioxidant/stabilizer) CT - -    172 27 5-kdsj-Nmeeldpddhozjxfxxxclsxa resin PTBP CT  - -      ANTIBIOTICS & ANTIMYCOTICS    # Substance 2 days 4 days remarks   173 1 Erythromycine - -    174 2 Framycetine Sulphate - -    175 3 Fusidic Acid Sodium Salt - -     4 Gentamicin Sulphate - -     5 Neomycine Sulphate - -     6 Oxytetracycline  - -     7 Polymyxin B Sulphate - -    180 8 Tetracycline-HCL - -     9 Sulfanilamide -  -     10 Metronidazole - -     11 Oxyquinoline Mix - -     12 Nitrofurazone - -    185 13 Nystatin - -    186 14 Clotrimazole - -      Atopy Screen (Placed 10/14/21)    No Substance Readings (15 min) Evaluation   POS Histamine 1mg/ml -    NEG NaCl 0.9% -      No Substance Readings (15 min) Evaluation   1 Alternaria alternata (tenuis)  -    2 Cladosporium herbarum -    3 Aspergillus fumigatus -    4 Penicillium notatum -    5 Dermatophagoides pteronyssinus -    6 Dermatophagoides farinae -    7 Dog epithelium (canis spp) -    8 Cat hair (mustapha catus) -    9 Cockroach   (Blatella americana & germanica) -    10 Grass mix midwest   (Carina, Orchard, Redtop, Rusty) -    11 Dez grass (sorghum halepense) -    12 Weed mix   (common Cocklebur, Lamb s quarters, rough redroot Pigweed, Dock/Sorrel) -    13 Mug wort (artemisia vulgare) -    14 Ragweed giant/short (ambrosia spp) -    15 White birch (Betula papyrifera) -    16 Tree mix 1 (Pecan, Maple BHR, Oak RVW, american Carnegie, black Ramah) -    17 Red cedar (juniperus virginia) -    18 Tree mix 2   (white Liang, river/red Birch, black [place], common Ogema, american Elm) -    19 Box elder/Maple mix (acer spp) -    20 Lincoln shagbark (carya ovata) -           Conclusion      DRUG ALLERGY TEST SERIES     ANTIBIOTICS: PENICILLINS      Prick Tests         Substance/ Allergen Conc Result (15min) Remarks    Benzylpenicillin (Penicillin G) 1 Roc IU/ml (600 mg)      Ampicillin 50 mg/ml        Intradermal Tests   immed immed delayed delayed      Substance Conc 1st dil  2nd dil   days  days remarks    Histamine Hydrochloride (ALK) 0.1 mg/ml         NaCl 0.9%         Benzylpenicillin (Penicillin G) 1:100         Ampicillin 1:10        * Only with limited indication    V A Patch Tests   as is  as is 1:2 paraffin 1:2 paraffin       Substance Conc days days days days remarks     Benzylpenicillin (Penicillin G) 1 Roc IU/ml (600 mg)          Ampicillin 50 mg/ml          DRUG  ALLERGY TEST SERIES     ANTIBIOTICS: VARIOUS     Prick Tests         Substance/ Allergen Concentration Result (15min) Remarks    Clindamycin 150 mg/ml      Bactrim 1g / 10ml        Intradermal Tests   immed immed delayed delayed      Substance Conc 1st dil (15 ) 2nd dil (15 )   days   days remarks    Histamine Hydrochloride  0.1 mg/ml         NaCl 0.9%         Clindamycin 1:10         Bactrim 1:5          V A Patch Tests   as is  as is 1:2 paraffin 1:2 paraffin       Substance Conc   days   days   days   days remarks     Clindamycin 150 mg/ml          Bactrim 1g / 10ml

## 2021-10-16 DIAGNOSIS — Z88.9 DRUG ALLERGY: Primary | ICD-10-CM

## 2021-10-16 RX ORDER — AMPICILLIN 1 G/1
1 INJECTION, POWDER, FOR SOLUTION INTRAMUSCULAR; INTRAVENOUS ONCE
Status: ACTIVE | OUTPATIENT
Start: 2021-10-18

## 2021-10-16 RX ORDER — SULFAMETHOXAZOLE AND TRIMETHOPRIM 80; 16 MG/ML; MG/ML
80 INJECTION INTRAVENOUS ONCE
Status: ACTIVE | OUTPATIENT
Start: 2021-10-18

## 2021-10-16 RX ORDER — PENICILLIN G POTASSIUM 5000000 [IU]/1
5000000 INJECTION, POWDER, FOR SOLUTION INTRAMUSCULAR; INTRAVENOUS ONCE
Status: ACTIVE | OUTPATIENT
Start: 2021-10-18

## 2021-10-17 NOTE — PROGRESS NOTES
[facility] Dermato-allergology Note  Office visit  Encounter Date: Oct 18, 2021  ____________________________________________    CC: No chief complaint on file.      HPI:  Ms. Viridiana Payan is a(n) 61 year old female who presents today as a return patient for allergy consultation  - Follow-up in Derm-Allergy clinic for allergy tests  - otherwise feeling well in usual state of health    Physical exam:  General: In no acute distress, well-developed, well-nourished  Eyes: no conjunctivitis  ENT: no signs of rhinitis   Pulmonary: no wheezing or coughing  Skin: Focused examination of the skin on test sites was performed = see test results below  No active eczematous skin lesions on tests sites, particularly back    Earlier History and Allergy exams:  - Pruritic rash March 2021, back, feet, hands d/t new polyester comforter per patient. ~3 days. Patient has photos of rash,   - Seasonal allergies for >30 years, remote history of chronic bronchitis   - Seen by Dr. Santo 6/1/2021 and negative blood test   - Not on any allergy treatments. Used Claritin and Flonase in the past >5 years. No longer using them. Start in late summer, end mid-fall. Runny nose and congestion coughing. Worse at night.   - Chronic onychomycosis - fingers and toenails. Notes many fungal infections. Cultures were negative per visit with Nisha. Took po fluconazole for 3 years that did not clear it + nail picking.   - otherwise feeling well in usual state of health  - No history of asthma. No atopic dermatitis. No family history.   - Use all free and clear products. Using pacifica and lancome makeup brands.     >> some seasonal Bronchitis and improvement on Claritine = 20 years ago sensitization to ragweed, dust, dogs    Skin: seen pictures in phone --> over the toes eczematous lesions dorsal to base and itchy, eczematous lesions on the back ==> worst in March    Past Medical History:   Patient Active Problem List   Diagnosis    (none) - all problems resolved or deleted     Past Medical History:   Diagnosis Date     ADHD (attention deficit hyperactivity disorder)      Allergic rhinitis      Asthma      Chemical dependency (H)     age 16     Depressive disorder      Knee injury     Work comp     Migraine      Smoker 10/2007    1ppd     Suicide attempt (H)     2 times with pills     Uterine fibroids affecting pregnancy        Allergies:  Allergies   Allergen Reactions     Clindamycin Hives     Codeine Itching     Fluticasone Other (See Comments)     Nose bleeds     Latex Other (See Comments)     Skin burning      Penicillins      Propoxyphene      Sulfa Drugs        Medications:  Current Outpatient Medications   Medication     azelastine (ASTELIN) 0.1 % nasal spray     Ginkgo Biloba Extract 40 MG CAPS     Iodine, Kelp, (KELP PO)     Multiple Vitamins-Minerals (HAIR VITAMINS PO)     Omega-3 Fatty Acids (FISH OIL PO)     TURMERIC PO     No current facility-administered medications for this visit.       Social History:  The patient works at home at a [employer] center. Patient has the following hobbies or non-occupational exposure: none.     Family History:  Family History   Problem Relation Age of Onset     Chronic Obstructive Pulmonary Disease Father      Allergies Father         Seasonal     Breast Cancer Mother      Skin Cancer Brother      Rheumatoid Arthritis Maternal Aunt      Heart Disease Paternal Uncle      Bone Cancer Paternal Grandfather        Previous Labs, Allergy Tests, Dermatopathology, Imaging:      Referred By: Karsten Santo MD  [address] [place] [address]     Allergy Tests:    Past Allergy Test    Order for Future Allergy Testing:    [x] Outpatient  [] Inpatient: [place]..../ Bed ....       Skin Atopy (atopic dermatitis) [] Yes   [x] No .........  Contact allergies:   [] Yes   [x] No ..........  Hand eczema:   [] Yes   [] No           Leading hand:   [] R   [] L       [] Ambidextrous         Drug allergies:        [] Yes    [x] No  Which?.Hives to Penicillins and Clindamycin    Urticaria/Angioedema  [] Yes   [x] No .........  Food Allergy:  [] Yes   [x] No  which?......  Pets :  [x] Yes   [] No  Which?..Fran dog         [x]  Rhinitis   [x] Conjunctivitis   [] Sinusitis   [] Polyposis   [] Otitis   [] Pharyngitis         []  none  Operations:   [] Tonsils   [] Septum   [] Sinus   [] Polyposis        [x] Asthma bronchiale/Bronchitis   [] Coughing      []  none  Symptoms (mostly Rhinoconjunctivitis and Asthma) aggravated by:  Season   [] I   [] II   [] III   [] IV   []V   []VI   []VII   []VIII   [x]IX   [x]X   []XI   []XI     [x] perennial Bronchitis  Day time      [] morning   [] noon      [] evening        [] night    [] whole day........  []  none  Location/changes    [] inside        [] outside   [] mountains    [] sea     [] others.............   []  none  Triggers, specific     [] animals     [] plants     [] dust              [] others ...........................    []  none  Triggers, others       [] work          [] psyche    [] sport            [] others .............................  []  none  Irritant                [] phys efforts [] smoke    [] heat/cold     [] odors  []others............... []  none    Order for PATCH TESTS  Reason for tests (suspected allergy): recurrent dermatitis  Known previous allergies: none  Standardized panels  [x] Standard panel (40 tests)  [x] Preservatives & Antimicrobials (31 tests)  [x] Emulsifiers & Additives (25 tests)   [x] Perfumes/Flavours & Plants (25 tests)  [] Hairdresser panel (12 tests)  [x] Rubber Chemicals (22 tests)  [x] Plastics (26 tests)  [] Colorants/Dyes/Food additives (20 tests)  [] Metals (implants/dental) (24 tests)  [] Local anaesthetics/NSAIDs (13 tests)  [x] Antibiotics & Antimycotics (14 tests)   [] Corticosteroids (15 tests)   [] Photopatch test (62 tests)   [] others: ...      [] Patient's own products: ...    DO NOT test if chemical or biological identity is  unknown!     always ask from patient the product information and safety sheets (MSDS)       Order for PRICK TESTS    Reason for tests (suspected allergy): seasonal and probably perennial Rhinitis and Bronchitis  Known previous allergies: HDM and ragweed    Standardized prick panels  [x] Atopic panel (20 tests)  [] Pediatric Panel (12 tests)  [] Milk, Meat, Eggs, Grains (20 tests)   [] Dust, Epithelia, Feathers (10 tests)  [] Fish, Seafood, Shellfish (17 tests)  [] Nuts, Beans (14 tests)  [] Spice, Vegetable, Fruit (17 tests)  [] Pollen Panel = Tree, Grass, Weed (24 tests)  [] Others: ...      [] Patient's own products: ...    DO NOT test if chemical or biological identity is unknown!     always ask from patient the product information and safety sheets (MSDS)     Standardized intradermal tests  [x] Penicillium notatum [x] Aspergillus fumigatus [x] House dust mites D.far & D. pteron  [] Cat    [x] dog  [] Others: ...  [] Bee venom   [] Wasp venom  !!Specific protocol with dilutions!!       Order for Drug allergy tests (prick & Intradermal &  patch tests)    [x] Penicillin G  [x] Ampicillin [] Cefazolin   [] Ceftriaxone   [] Ceftazidime  [x] Bactrim    [x] Others: Clindamycine  Order for ... as test date      RESULTS & EVALUATION of PATCH TESTS    Patch test readings after     [x] 2 days, [] 3 days [x] 4 days, [] 5 days,   Other duration: ...    10/18/21 application of patch tests with results:    STANDARD Series        # Substance 2 days 4 days remarks    1 Fahad Mix [C] - -     2 Colophony - -     3  2-Mercaptobenzothiazole  - -      4 Methylisothiazolinone - -     5 Carba Mix - -     6 Thiuram Mix [A] - -     7 Bisphenol A Epoxy Resin - -     8 Y-Qbyc-Kieezqeaeqq-Formaldehyde Resin NA NA     9 Mercapto Mix [A] - -     10 Black Rubber Mix- PPD [B] - -     11 Potassium Dichromate  -  -     12 Balsam of [place] (Myroxylon Pereirae Resin) - -     13 Nickel Sulphate Hexahydrate - -     14 Mixed Dialkyl Thiourea - -     15  Paraben Mix [B] - -     16 Methyldibromo Glutaronitrile - -     17 Fragrance Mix - -     18 2-Bromo-2-Nitropropane-1,3-Diol (Bronopol) CT - -     19 Lyral - -     20 Tixocortol-21- Pivalate CT - -     21 Diazolidiyl Urea (Germall II) - -     22 Methyl Methacrylate - -     23 Cobalt (II) Chloride Hexahydrate - -     24 Fragrance Mix II  - -     25 Compositae Mix - -     26 Benzoyl Peroxide - -     27 Bacitracin - -     28 Formaldehyde - -     29 Methylchloroisothiazolinone / Methylisothiazolinone - -     30 Corticosteroid Mix CT NA NA     31 Sodium Lauryl Sulfate - -     32 Lanolin Alcohol - -     33 Turpentine - -     34 Cetylstearylalcohol - -     35 Chlorhexidine Dicluconate - -     36 Budenoside NA NA     37 Imidazolidinyl Urea  NA NA     38 Ethyl-2 Cyanoacrylate NA NA     39 Quaternium 15 (Dowicil 200) - -     40 Decyl Glucoside - -     PRESERVATIVES & ANTIMICROBIALS        # Substance 2 days 4 days remarks   41 1  1,2-Benzisothiazoline-3-One, Sodium Salt - -    42 2  1,3,5-Alok (2-Hydroxyethyl) - Hexahydrotriazine (Grotan BK) - -    43 3 9-Hfdhckvuuwbpn-2-Nitro-1, 3-Propanediol NA NA    44 4  3, 4, 4' - Triclocarban - -    45 5 4 - Chloro - 3 - Cresol - -    46 6 4 - Chloro - 4 - Xylenol (PCMX) - -    47 7 7-Ethylbicyclooxazolidine (Bioban HM8980) - -    48 8 Benzalkonium Chloride CT - -    49 9 Benzyl Alcohol - -    50 10 Cetalkonium Chloride - -    51 11 Cetylpyrimidine Chloride  - -    52 12 Chloroacetamide - -    53 13 DMDM Hydantoin - -    54 14 Glutaraldehyde - -    55 15 Triclosan - -    56 16 Glyoxal Trimeric Dihydrate - -    57 17 Iodopropynyl Butylcarbamate - -    58 18 Octylisothiazoline NA NA    59 19 Bithionol CT - -    60 20 Bioban P 1487 (Nitrobutyl) Morpholine/(Ethylnitro-Trimethylene) Dimorpholine - -    61 21 Phenoxyethanol NA NA    62 22 Phenyl Salicylate - -    63 23 Povidone Iodine - -    64 24 Sodium Benzoate - -    65 25 Sodium Disulfite NA NA    66 26 Sorbic Acid - -    67 27 Thimerosal       68 28 Melamine Formaldehyde Resin      69 29 Ethylenediamine Dihydrochloride        Parabens      70 30 Butyl-P-Hydroxybenzoate NA NA    71 31 Ethyl-P-Hydroxybenzoate - -    72 32 Methyl-P-Hydroxybenzoate - -    73 33 Propyl-P-Hydroxybenzoate - -     EMULSIFIERS & ADDITIVES       # Substance 2 days 4 days remarks   74 1 Polyethylene Glycol-400 - -    75 2 Cocamidopropyl Betaine - -    76 3 Amerchol L101 - -    77 4 Propylene Glycol - -    78 5 Triethanolamine - -    79 6 Sorbitane Sesquiolate CT - -    80 7 Isopropylmyristate - -    81 8 Polysorbate 80 CT - -    82 9 Amidoamine   (Stearamidopropyl Dimethylamine) - -    83 10 Oleamidopropyl Dimethylamine - -    84 11 Lauryl Glucoside NA NA    85 12 Coconut Diethanolamide  - -    86 13 2-Hydroxy-4-Methoxy Benzophenone (Oxybenzone) - -    87 14 Benzophenone-4 (Sulisobenzon) NA NA    88 15 Propolis - -    89 16 Dexpanthenol NA NA    90 17 Carboxymethyl Cellulose Sodium NA NA    91 18 Abitol - -    92 19 Tert-Butylhydroquinone - -    93 20 Benzyl Salicylate - -    94 21 Dimethylaminopropylamin (DMPA) CT? D053      95 22 Zinc Pyrithione (Zinc Omadine) CT? Z006      96 23 Alok(Hydroxymethyl) Nitromethane CT        Antioxidant - -    97 24 Dodecyl Gallate - -    98 25 Butylhydroxyanisole (BHA) - -    99 26 Butylhydroxytoluene (BHT) - -    100 27 Di-Alpha-Tocopherol (Vit E) - -    101 28 Propyl Gallate - -     PERFUMES, FLAVORS & PLANTS        # Substance 2 days 4 days remarks   102 1 Benzyl Cinnamate NA NA    103 2 Di-Limonene (Dipentene) - -    104 3 Cananga Odorata (Ylang Ylang) (I) - -    105 4 Lichen Acid Mix - -    106 5 Mentha Piperita Oil (Peppermint Oil) - -    107 6 Sesquiterpenelactone mix - -    108 7 Tea Tree Oil, Oxidized - -    109 8 Wood Tar Mix - -    110 9 Abietic Acid - -    111 10 Lavendula Angustifolia Oil (Lavender Oil) - -    112 11 Fragrance mix II CT * - -      Fragrance Mix I      113 12 [place] Absolute - -    114 13 Eugenol - -    115 14  Geraniol - -    116 15 Hydroxycitronellal - -    117 16 Isoeugenol - -    118 17 Cinnamic Aldehyde - -    119 18 Cinnamic Alcohol  - -      Fragrance mix II      120 19 Citronellol - -    121 20 Alpha-Hexylcinnamic Aldehyde    - -    122 21 Citral NA NA    123 22 Farnesol - -    124 23 Coumarin - -      Hexylcinnamic aldehyde, Coumarin, Farnesol, Hydroxyisohexy3-cyclohexene carboxaldehyde, citral, citrolellol   RUBBER CHEMICALS        # Substance 2 days 4 days remarks     Carbamate      125 1 Zinc Bis ( Diethyldithio carbamate ) (ZDEC) - -     2 Zinc Bis (Dibutyldithiocarbamate) - -     3 1,3-Diphenylguanidine (DPG) - -      Thiourea       4 Dibutylthiourea - -     5 Diphenyltiourea - -    130 6 Thiourea NA NA      Mercapto chemicals       7 Morpholinyl Mercaptobenzothiazole - -     8 B-Oqcvdxnpua-7-Benzothiazyl-Sulfenamide - -     9 Dibenzothiazyl Disulfide - -      Thiuram chemicals       10 Dipentamethylenethiuram Disulfide - -    135 11 Tetraethylthiuram Disulfide (Disulfiram) - -     12 Tetramethylthiuram Disulfide - -     13 Tetramethyl Thiuram Monosulfide (TMTM) - -      4-Phenylenediamine derivatives       14 N-Isopropyl-N'-Phenyl-P-Phenylenediamine (IPPD) - -     15 Nuwmorfd-L-Embftkqtquxgvspr (DPPD) - -      Various Rubber Additives      140 16 Hydroquinone Monobenzylether  - -     17 Hexamethylenetetramine - -     18 4,4'-Dihydroxybiphenyl - -     19 Cyclohexylthiophtalimide - -     20 N-Phenyl-B-Naphthylamine - -    145 21 Dodecyl Mercaptan - -      PLASTICS        # Substance 2 days 4 days remarks     Acrylates - -    146 1 2-Hydroxyethyl Methacrylate (HEMA) - -     2 1,4-Butandioldimethacrylate (BUDMA) - -     3  2-Ethylhexyl Acrylate - -     4 Bisphenol-A-Dimethacrylate  - -    150 5 Diurethane-Dimethacrylate NA NA     6 Ethyleneglycoldimethacrylate (EGDMA) - -     7 Pentaerythritoltriacrylate (TYLER) - -     8 Triethylene Glycol Dimethacrylate (TEGDMA) - -      Synthetic material/additives        9  B-Lvtx-Hmkqmeztmlj - -    155 10 Tricresyl Phosphate NA NA     11 4-Njam-Uhqfqdsjrnhoh - -     12 Bis (2-Ethylhexyl) Phthalate - -     13 Dibutylphthalate - -     14 Dimethylphthalate - -    160 15 Toluene-2,4-Diisocyanate NA NA     16 Diphenylmethane-4,4''-Diisocyanate - -      EPOXY RESIN SYSTEMS        Reactive Solvents - -     17 Cresyl Glycidyl Ether - -     18 Butyl Glycidyl Ether - -     19 Phenyl Glycidyl Ether - -    165 20 1,4-Butanediol Diglycidyl Ether - -     21 1,6-Hexanediole Diglycidyl Ether - -      Hardener / Accelerator - -     22 Triethylenetetramine - -     23 Diethylenetriamine - -     24 Isophorone Diamine (IPD) - -    170 25 N,N-Dimethyl-P-Toluidine - -     26 4-kzec-Bergovcodkngh (antioxidant/stabilizer) CT - -    172 27 8-yini-Zbmdhqlhcanuqjycoplbwyj resin PTBP CT  - -    ANTIBIOTICS & ANTIMYCOTICS    # Substance 2 days 4 days remarks   173 1 Erythromycine - -     2 Framycetine Sulphate - -    175 3 Fusidic Acid Sodium Salt - -     4 Gentamicin Sulphate - -     5 Neomycine Sulphate - -     6 Oxytetracycline  - -     7 Polymyxin B Sulphate - -    180 8 Tetracycline-HCL - -     9 Sulfanilamide - -     10 Metronidazole - -     11 Oxyquinoline Mix - -     12 Nitrofurazone - -    185 13 Nystatin - -    186 14 Clotrimazole - -      Results of patch tests:                         Interpretation:  - Negative                    A    = Allergic      (+) Erythema    TI   = Toxic/irritant   + E + Infiltration    RaP = Relevance at Present     ++ E/I + Papulovesicle   Rpr  = Relevance Previously     +++ E/I/P + Blister     nR   = No Relevance    DRUG ALLERGY TEST SERIES 10/20/2021        Prick Tests         Substance/ Allergen Conc Result (20 min) Remarks    Histamine Hydrochloride (ALK) 0.1 mg/ml      NaCl 0.9%      Benzylpenicillin (Penicillin G) 1 Roc IU/ml (600 mg)      Ampicillin 100mg/ml      Clindamycin 150 mg/ml      Bactrim 80/400 mg/ml        Intradermal Tests   immed immed delay delay       Substance Conc 1st dil  2nd dil  days  days remarks    NaCl 0.9%         Benzylpenicillin (Penicillin G) 1:100         Ampicillin 1:10         Clindamycin 1:10         Bactrim 1:100            Patch Tests  as is as is 1:2 1:2     As Is  Vas Substance Conc days days days days remarks     Benzylpenicillin (Penicillin G) 1 Roc IU/ml (600 mg)          Ampicillin 100 mg/ml          Clindamycin 150 mg/ml          Bactrim 80/400 mg/ml            Atopy Screen (Placed 10/18/21)    No Substance Readings (15 min) Evaluation   POS Histamine 1mg/ml ++    NEG NaCl 0.9% -      No Substance Readings (15 min) Evaluation   1 Alternaria alternata (tenuis)  -    2 Cladosporium herbarum -    3 Aspergillus fumigatus -    4 Penicillium notatum -    5 Dermatophagoides pteronyssinus -    6 Dermatophagoides farinae -    7 Dog epithelium (canis spp) -    8 Cat hair (mustapha catus) -    9 Cockroach   (Blatella americana & germanica) -    10 Grass mix midwest   (Carina, Orchard, Redtop, Rusty) -    11 Dez grass (sorghum halepense) -    12 Weed mix   (common Cocklebur, Lamb s quarters, rough redroot Pigweed, Dock/Sorrel) -    13 Mug wort (artemisia vulgare) -    14 Ragweed giant/short (ambrosia spp) -    15 White birch (Betula papyrifera) -    16 Tree mix 1 (Pecan, Maple BHR, Oak RVW, american [place], black [place]) -    17 Red cedar (juniperus virginia) -    18 Tree mix 2   (white Li[place], river/red Birch, black [place], common [place], american Elm) -    19 Box elder/Maple mix (acer spp) -    20 [place] shagbark (carya ovata) -           Conclusion: no signs for atopy in prick tests      Intradermal Testing (Placed 10/18/21)    No Substance Conc.  Reading (15min)  immediate Papule [mm] / Erythema [mm] Reading   (... days)  delayed Papule [mm] / Erythema [mm] Remarks   DF Standard Dust Mite - D. Farinae 1:10 -   -    DP Standard Dust Mite - D. Pteronyssinus 1:10 + 5/5  -    A Aspergillus fumigatus  1:10 -   -    P Penicillium notatum 1:10 -    -     Dog epithelium 1:10 +/++ 8/9  -    Conclusions: in intradermal tests some immediate type reaction to the dog epithelium and less to one of the house dust mites. We will read on Wednesday if delayed reaction    [] No relevant allergic reaction observed    [] Allergic reaction diagnosed against following allergens:      Interpretation/ remarks:   See later    [] Patient information given   [] ACDS CAMP information's (# ....) to following compounds: .....   [] General information's to following compounds: ......      ________________________________    Assessment & Plan:    ==> Final Diagnosis:     # atopic predisposition with    Seasonal RC in fall    Possible perennial RC and Bronchitis with morning sneezing = dust mites and dog in IDT    Recurrent dermatitis on feet and trunk DDx atopic dermatitis  * chronic illness with exacerbation, progression, side effects from treatment      # suspicion for allergic contact dermatitis on feet and maybe finger and trunk  * chronic illness with exacerbation, progression, side effects from treatment    # nail dystrophy (20 nail syndrome) DDx Lichen planus or allergic contact dermatitis  * chronic illness with exacerbation, progression, side effects from treatment     >> explained to patient that there is no direct contra-indication against the COVID vaccines, incl PEG      These conclusions are made at the best of one's knowledge and belief based on the provided evidence such as patient's history and allergy test results and they can change over time or can be incomplete because of missing information's.    ==> Treatment Plan:  Plan allergy tests  Patient should do COVID vaccine ASAP     Procedures Performed: Allergy tests, including prick, intradermal and patch tests    Staff: : provider    Follow-up in Derm-Allergy clinic for 1st readings of patch tests after 2 days (virtual) and 2nd readings and final conclusions after 4 days (in person)    ___________________________      I spent a total of 35 minutes with Viridiana Payan during today s  visit. This time was spent discussing all the individual test results, correlating them to the clinical relevance, counseling the patient and/or coordinating care and performing allergy tests

## 2021-10-18 ENCOUNTER — OFFICE VISIT (OUTPATIENT)
Dept: ALLERGY | Facility: CLINIC | Age: 61
End: 2021-10-18
Payer: COMMERCIAL

## 2021-10-18 DIAGNOSIS — J30.89 SEASONAL AND PERENNIAL ALLERGIC RHINOCONJUNCTIVITIS: ICD-10-CM

## 2021-10-18 DIAGNOSIS — Z88.9 ATOPY: ICD-10-CM

## 2021-10-18 DIAGNOSIS — Z88.9 DRUG ALLERGY: Primary | ICD-10-CM

## 2021-10-18 DIAGNOSIS — L23.89 ALLERGIC CONTACT DERMATITIS DUE TO OTHER AGENTS: ICD-10-CM

## 2021-10-18 DIAGNOSIS — J30.2 SEASONAL AND PERENNIAL ALLERGIC RHINOCONJUNCTIVITIS: ICD-10-CM

## 2021-10-18 DIAGNOSIS — L60.3 NAIL DYSTROPHY: ICD-10-CM

## 2021-10-18 DIAGNOSIS — L30.9 DERMATITIS: Primary | ICD-10-CM

## 2021-10-18 DIAGNOSIS — H10.10 SEASONAL AND PERENNIAL ALLERGIC RHINOCONJUNCTIVITIS: ICD-10-CM

## 2021-10-18 PROCEDURE — 95024 IQ TESTS W/ALLERGENIC XTRCS: CPT | Performed by: DERMATOLOGY

## 2021-10-18 PROCEDURE — 95044 PATCH/APPLICATION TESTS: CPT | Mod: 59 | Performed by: DERMATOLOGY

## 2021-10-18 PROCEDURE — 95004 PERQ TESTS W/ALRGNC XTRCS: CPT | Performed by: DERMATOLOGY

## 2021-10-18 PROCEDURE — 95044 PATCH/APPLICATION TESTS: CPT | Performed by: DERMATOLOGY

## 2021-10-18 RX ORDER — TRIAMCINOLONE ACETONIDE 1 MG/G
OINTMENT TOPICAL 2 TIMES DAILY
Qty: 80 G | Refills: 5 | Status: SHIPPED | OUTPATIENT
Start: 2021-10-18

## 2021-10-18 NOTE — LETTER
10/18/2021         RE: Viridiana Payan  [address] [facility] [address]        Dear Colleague,    Thank you for referring your patient, Viridiana Payan, to the [facility] [place]. Please see a copy of my visit note below.    [facility] Dermato-allergology Note  Office visit  Encounter Date: Oct 18, 2021  ____________________________________________    CC: No chief complaint on file.      HPI:  Ms. Viridiana Payan is a(n) 61 year old female who presents today as a return patient for allergy consultation  - Follow-up in Derm-Allergy clinic for allergy tests  - otherwise feeling well in usual state of health    Physical exam:  General: In no acute distress, well-developed, well-nourished  Eyes: no conjunctivitis  ENT: no signs of rhinitis   Pulmonary: no wheezing or coughing  Skin: Focused examination of the skin on test sites was performed = see test results below  No active eczematous skin lesions on tests sites, particularly back    Earlier History and Allergy exams:  - Pruritic rash March 2021, back, feet, hands d/t new polyester comforter per patient. ~3 days. Patient has photos of rash,   - Seasonal allergies for >30 years, remote history of chronic bronchitis   - Seen by Dr. Santo 6/1/2021 and negative blood test   - Not on any allergy treatments. Used Claritin and Flonase in the past >5 years. No longer using them. Start in late summer, end mid-fall. Runny nose and congestion coughing. Worse at night.   - Chronic onychomycosis - fingers and toenails. Notes many fungal infections. Cultures were negative per visit with [facility]. Took po fluconazole for 3 years that did not clear it + nail picking.   - otherwise feeling well in usual state of health  - No history of asthma. No atopic dermatitis. No family history.   - Use all free and clear products. Using pacifica and lancome makeup brands.     >> some seasonal Bronchitis and improvement on Claritine = 20  years ago sensitization to ragweed, dust, dogs    Skin: seen pictures in phone --> over the toes eczematous lesions dorsal to base and itchy, eczematous lesions on the back ==> worst in March    Past Medical History:   Patient Active Problem List   Diagnosis   (none) - all problems resolved or deleted     Past Medical History:   Diagnosis Date     ADHD (attention deficit hyperactivity disorder)      Allergic rhinitis      Asthma      Chemical dependency (H)     age 16     Depressive disorder      Knee injury     Work comp     Migraine      Smoker 10/2007    1ppd     Suicide attempt (H)     2 times with pills     Uterine fibroids affecting pregnancy        Allergies:  Allergies   Allergen Reactions     Clindamycin Hives     Codeine Itching     Fluticasone Other (See Comments)     Nose bleeds     Latex Other (See Comments)     Skin burning      Penicillins      Propoxyphene      Sulfa Drugs        Medications:  Current Outpatient Medications   Medication     azelastine (ASTELIN) 0.1 % nasal spray     Ginkgo Biloba Extract 40 MG CAPS     Iodine, Kelp, (KELP PO)     Multiple Vitamins-Minerals (HAIR VITAMINS PO)     Omega-3 Fatty Acids (FISH OIL PO)     TURMERIC PO     No current facility-administered medications for this visit.       Social History:  The patient works at home at a [employer] center. Patient has the following hobbies or non-occupational exposure: none.     Family History:  Family History   Problem Relation Age of Onset     Chronic Obstructive Pulmonary Disease Father      Allergies Father         Seasonal     Breast Cancer Mother      Skin Cancer Brother      Rheumatoid Arthritis Maternal Aunt      Heart Disease Paternal Uncle      Bone Cancer Paternal Grandfather        Previous Labs, Allergy Tests, Dermatopathology, Imaging:      Referred By: Karsten Santo MD  [address] [place] [id number]     Allergy Tests:    Past Allergy Test    Order for Future Allergy Testing:    [x] Outpatient  [] Inpatient:  [place]..../ Bed ....       Skin Atopy (atopic dermatitis) [] Yes   [x] No .........  Contact allergies:   [] Yes   [x] No ..........  Hand eczema:   [] Yes   [] No           Leading hand:   [] R   [] L       [] Ambidextrous         Drug allergies:        [] Yes   [x] No  Which?.Hives to Penicillins and Clindamycin    Urticaria/Angioedema  [] Yes   [x] No .........  Food Allergy:  [] Yes   [x] No  which?......  Pets :  [x] Yes   [] No  Which?..Chihuahua dog         [x]  Rhinitis   [x] Conjunctivitis   [] Sinusitis   [] Polyposis   [] Otitis   [] Pharyngitis         []  none  Operations:   [] Tonsils   [] Septum   [] Sinus   [] Polyposis        [x] Asthma bronchiale/Bronchitis   [] Coughing      []  none  Symptoms (mostly Rhinoconjunctivitis and Asthma) aggravated by:  Season   [] I   [] II   [] III   [] IV   []V   []VI   []VII   []VIII   [x]IX   [x]X   []XI   []XI     [x] perennial Bronchitis  Day time      [] morning   [] noon      [] evening        [] night    [] whole day........  []  none  Location/changes    [] inside        [] outside   [] mountains    [] sea     [] others.............   []  none  Triggers, specific     [] animals     [] plants     [] dust              [] others ...........................    []  none  Triggers, others       [] work          [] psyche    [] sport            [] others .............................  []  none  Irritant                [] phys efforts [] smoke    [] heat/cold     [] odors  []others............... []  none    Order for PATCH TESTS  Reason for tests (suspected allergy): recurrent dermatitis  Known previous allergies: none  Standardized panels  [x] Standard panel (40 tests)  [x] Preservatives & Antimicrobials (31 tests)  [x] Emulsifiers & Additives (25 tests)   [x] Perfumes/Flavours & Plants (25 tests)  [] Hairdresser panel (12 tests)  [x] Rubber Chemicals (22 tests)  [x] Plastics (26 tests)  [] Colorants/Dyes/Food additives (20 tests)  [] Metals (implants/dental) (24  tests)  [] Local anaesthetics/NSAIDs (13 tests)  [x] Antibiotics & Antimycotics (14 tests)   [] Corticosteroids (15 tests)   [] Photopatch test (62 tests)   [] others: ...      [] Patient's own products: ...    DO NOT test if chemical or biological identity is unknown!     always ask from patient the product information and safety sheets (MSDS)       Order for PRICK TESTS    Reason for tests (suspected allergy): seasonal and probably perennial Rhinitis and Bronchitis  Known previous allergies: HDM and ragweed    Standardized prick panels  [x] Atopic panel (20 tests)  [] Pediatric Panel (12 tests)  [] Milk, Meat, Eggs, Grains (20 tests)   [] Dust, Epithelia, Feathers (10 tests)  [] Fish, Seafood, Shellfish (17 tests)  [] Nuts, Beans (14 tests)  [] Spice, Vegetable, Fruit (17 tests)  [] Pollen Panel = Tree, Grass, Weed (24 tests)  [] Others: ...      [] Patient's own products: ...    DO NOT test if chemical or biological identity is unknown!     always ask from patient the product information and safety sheets (MSDS)     Standardized intradermal tests  [x] Penicillium notatum [x] Aspergillus fumigatus [x] House dust mites D.far & D. pteron  [] Cat    [x] dog  [] Others: ...  [] Bee venom   [] Wasp venom  !!Specific protocol with dilutions!!       Order for Drug allergy tests (prick & Intradermal &  patch tests)    [x] Penicillin G  [x] Ampicillin [] Cefazolin   [] Ceftriaxone   [] Ceftazidime  [x] Bactrim    [x] Others: Clindamycine  Order for ... as test date      RESULTS & EVALUATION of PATCH TESTS    Patch test readings after     [x] 2 days, [] 3 days [x] 4 days, [] 5 days,   Other duration: ...    10/18/21 application of patch tests with results:    STANDARD Series        # Substance 2 days 4 days remarks    1 Fahad Mix [C] - -     2 Colophony - -     3  2-Mercaptobenzothiazole  - -      4 Methylisothiazolinone - -     5 Carba Mix - -     6 Thiuram Mix [A] - -     7 Bisphenol A Epoxy Resin - -     8  N-Hebl-Vgzldgdmaxj-Formaldehyde Resin NA NA     9 Mercapto Mix [A] - -     10 Black Rubber Mix- PPD [B] - -     11 Potassium Dichromate  -  -     12 Balsam of [place] (Myroxylon Pereirae Resin) - -     13 Nickel Sulphate Hexahydrate - -     14 Mixed Dialkyl Thiourea - -     15 Paraben Mix [B] - -     16 Methyldibromo Glutaronitrile - -     17 Fragrance Mix - -     18 2-Bromo-2-Nitropropane-1,3-Diol (Bronopol) CT - -     19 Lyral - -     20 Tixocortol-21- Pivalate CT - -     21 Diazolidiyl Urea (Germall II) - -     22 Methyl Methacrylate - -     23 Cobalt (II) Chloride Hexahydrate - -     24 Fragrance Mix II  - -     25 Compositae Mix - -     26 Benzoyl Peroxide - -     27 Bacitracin - -     28 Formaldehyde - -     29 Methylchloroisothiazolinone / Methylisothiazolinone - -     30 Corticosteroid Mix CT NA NA     31 Sodium Lauryl Sulfate - -     32 Lanolin Alcohol - -     33 Turpentine - -     34 Cetylstearylalcohol - -     35 Chlorhexidine Dicluconate - -     36 Budenoside NA NA     37 Imidazolidinyl Urea  NA NA     38 Ethyl-2 Cyanoacrylate NA NA     39 Quaternium 15 (Dowicil 200) - -     40 Decyl Glucoside - -     PRESERVATIVES & ANTIMICROBIALS        # Substance 2 days 4 days remarks   41 1  1,2-Benzisothiazoline-3-One, Sodium Salt - -    42 2  1,3,5-Alok (2-Hydroxyethyl) - Hexahydrotriazine (Grotan BK) - -    43 3 8-Auahuuargqmdm-7-Nitro-1, 3-Propanediol NA NA    44 4  3, 4, 4' - Triclocarban - -    45 5 4 - Chloro - 3 - Cresol - -    46 6 4 - Chloro - 4 - Xylenol (PCMX) - -    47 7 7-Ethylbicyclooxazolidine (Bioban CX4816) - -    48 8 Benzalkonium Chloride CT - -    49 9 Benzyl Alcohol - -    50 10 Cetalkonium Chloride - -    51 11 Cetylpyrimidine Chloride  - -    52 12 Chloroacetamide - -    53 13 DMDM Hydantoin - -    54 14 Glutaraldehyde - -    55 15 Triclosan - -    56 16 Glyoxal Trimeric Dihydrate - -    57 17 Iodopropynyl Butylcarbamate - -    58 18 Octylisothiazoline NA NA    59 19 Bithionol CT - -    60 20  Bioban P 1487 (Nitrobutyl) Morpholine/(Ethylnitro-Trimethylene) Dimorpholine - -    61 21 Phenoxyethanol NA NA    62 22 Phenyl Salicylate - -    63 23 Povidone Iodine - -    64 24 Sodium Benzoate - -    65 25 Sodium Disulfite NA NA    66 26 Sorbic Acid - -    67 27 Thimerosal      68 28 Melamine Formaldehyde Resin      69 29 Ethylenediamine Dihydrochloride        Parabens      70 30 Butyl-P-Hydroxybenzoate NA NA    71 31 Ethyl-P-Hydroxybenzoate - -    72 32 Methyl-P-Hydroxybenzoate - -    73 33 Propyl-P-Hydroxybenzoate - -     EMULSIFIERS & ADDITIVES       # Substance 2 days 4 days remarks   74 1 Polyethylene Glycol-400 - -    75 2 Cocamidopropyl Betaine - -    76 3 Amerchol L101 - -    77 4 Propylene Glycol - -    78 5 Triethanolamine - -    79 6 Sorbitane Sesquiolate CT - -    80 7 Isopropylmyristate - -    81 8 Polysorbate 80 CT - -    82 9 Amidoamine   (Stearamidopropyl Dimethylamine) - -    83 10 Oleamidopropyl Dimethylamine - -    84 11 Lauryl Glucoside NA NA    85 12 Coconut Diethanolamide  - -    86 13 2-Hydroxy-4-Methoxy Benzophenone (Oxybenzone) - -    87 14 Benzophenone-4 (Sulisobenzon) NA NA    88 15 Propolis - -    89 16 Dexpanthenol NA NA    90 17 Carboxymethyl Cellulose Sodium NA NA    91 18 Abitol - -    92 19 Tert-Butylhydroquinone - -    93 20 Benzyl Salicylate - -    94 21 Dimethylaminopropylamin (DMPA) CT? D053      95 22 Zinc Pyrithione (Zinc Omadine) CT? Z006      96 23 Alok(Hydroxymethyl) Nitromethane CT        Antioxidant - -    97 24 Dodecyl Gallate - -    98 25 Butylhydroxyanisole (BHA) - -    99 26 Butylhydroxytoluene (BHT) - -    100 27 Di-Alpha-Tocopherol (Vit E) - -    101 28 Propyl Gallate - -     PERFUMES, FLAVORS & PLANTS        # Substance 2 days 4 days remarks   102 1 Benzyl Cinnamate NA NA    103 2 Di-Limonene (Dipentene) - -    104 3 Cananga Odorata (Ylang Ylang) (I) - -    105 4 Lichen Acid Mix - -    106 5 Mentha Piperita Oil (Peppermint Oil) - -    107 6 Sesquiterpenelactone  mix - -    108 7 Tea Tree Oil, Oxidized - -    109 8 Wood Tar Mix - -    110 9 Abietic Acid - -    111 10 Lavendula Angustifolia Oil (Lavender Oil) - -    112 11 Fragrance mix II CT * - -      Fragrance Mix I      113 12 [place]oss Absolute - -    114 13 Eugenol - -    115 14 Geraniol - -    116 15 Hydroxycitronellal - -    117 16 Isoeugenol - -    118 17 Cinnamic Aldehyde - -    119 18 Cinnamic Alcohol  - -      Fragrance mix II      120 19 Citronellol - -    121 20 Alpha-Hexylcinnamic Aldehyde    - -    122 21 Citral NA NA    123 22 Farnesol - -    124 23 Coumarin - -      Hexylcinnamic aldehyde, Coumarin, Farnesol, Hydroxyisohexy3-cyclohexene carboxaldehyde, citral, citrolellol   RUBBER CHEMICALS        # Substance 2 days 4 days remarks     Carbamate      125 1 Zinc Bis ( Diethyldithio carbamate ) (ZDEC) - -     2 Zinc Bis (Dibutyldithiocarbamate) - -     3 1,3-Diphenylguanidine (DPG) - -      Thiourea       4 Dibutylthiourea - -     5 Diphenyltiourea - -    130 6 Thiourea NA NA      Mercapto chemicals       7 Morpholinyl Mercaptobenzothiazole - -     8 S-Tyaulhyxpa-8-Benzothiazyl-Sulfenamide - -     9 Dibenzothiazyl Disulfide - -      Thiuram chemicals       10 Dipentamethylenethiuram Disulfide - -    135 11 Tetraethylthiuram Disulfide (Disulfiram) - -     12 Tetramethylthiuram Disulfide - -     13 Tetramethyl Thiuram Monosulfide (TMTM) - -      4-Phenylenediamine derivatives       14 N-Isopropyl-N'-Phenyl-P-Phenylenediamine (IPPD) - -     15 Nrryjbsd-P-Bzcyecbxechriclh (DPPD) - -      Various Rubber Additives      140 16 Hydroquinone Monobenzylether  - -     17 Hexamethylenetetramine - -     18 4,4'-Dihydroxybiphenyl - -     19 Cyclohexylthiophtalimide - -     20 N-Phenyl-B-Naphthylamine - -    145 21 Dodecyl Mercaptan - -      PLASTICS        # Substance 2 days 4 days remarks     Acrylates - -    146 1 2-Hydroxyethyl Methacrylate (HEMA) - -     2 1,4-Butandioldimethacrylate (BUDMA) - -     3  2-Ethylhexyl  Acrylate - -     4 Bisphenol-A-Dimethacrylate  - -    150 5 Diurethane-Dimethacrylate NA NA     6 Ethyleneglycoldimethacrylate (EGDMA) - -     7 Pentaerythritoltriacrylate (TYLER) - -     8 Triethylene Glycol Dimethacrylate (TEGDMA) - -      Synthetic material/additives        9 Q-Mdir-Avixijgkdqt - -    155 10 Tricresyl Phosphate NA NA     11 7-Ssvu-Uvxfqzwzubuaf - -     12 Bis (2-Ethylhexyl) Phthalate - -     13 Dibutylphthalate - -     14 Dimethylphthalate - -    160 15 Toluene-2,4-Diisocyanate NA NA     16 Diphenylmethane-4,4''-Diisocyanate - -      EPOXY RESIN SYSTEMS        Reactive Solvents - -     17 Cresyl Glycidyl Ether - -     18 Butyl Glycidyl Ether - -     19 Phenyl Glycidyl Ether - -    165 20 1,4-Butanediol Diglycidyl Ether - -     21 1,6-Hexanediole Diglycidyl Ether - -      Hardener / Accelerator - -     22 Triethylenetetramine - -     23 Diethylenetriamine - -     24 Isophorone Diamine (IPD) - -    170 25 N,N-Dimethyl-P-Toluidine - -     26 5-qwbq-Bogsdzuvovrua (antioxidant/stabilizer) CT - -    172 27 3-zcwp-Nsdwxzsocpuygfdnmkocodj resin PTBP CT  - -    ANTIBIOTICS & ANTIMYCOTICS    # Substance 2 days 4 days remarks   173 1 Erythromycine - -     2 Framycetine Sulphate - -    175 3 Fusidic Acid Sodium Salt - -     4 Gentamicin Sulphate - -     5 Neomycine Sulphate - -     6 Oxytetracycline  - -     7 Polymyxin B Sulphate - -    180 8 Tetracycline-HCL - -     9 Sulfanilamide - -     10 Metronidazole - -     11 Oxyquinoline Mix - -     12 Nitrofurazone - -    185 13 Nystatin - -    186 14 Clotrimazole - -      Results of patch tests:                         Interpretation:  - Negative                    A    = Allergic      (+) Erythema    TI   = Toxic/irritant   + E + Infiltration    RaP = Relevance at Present     ++ E/I + Papulovesicle   Rpr  = Relevance Previously     +++ E/I/P + Blister     nR   = No Relevance    DRUG ALLERGY TEST SERIES 10/20/2021        Prick Tests         Substance/ Allergen  Conc Result (20 min) Remarks    Histamine Hydrochloride (ALK) 0.1 mg/ml      NaCl 0.9%      Benzylpenicillin (Penicillin G) 1 Roc IU/ml (600 mg)      Ampicillin 100mg/ml      Clindamycin 150 mg/ml      Bactrim 80/400 mg/ml        Intradermal Tests   immed immed delay delay      Substance Conc 1st dil  2nd dil  days  days remarks    NaCl 0.9%         Benzylpenicillin (Penicillin G) 1:100         Ampicillin 1:10         Clindamycin 1:10         Bactrim 1:100            Patch Tests  as is as is 1:2 1:2     As Is  Vas Substance Conc days days days days remarks     Benzylpenicillin (Penicillin G) 1 Roc IU/ml (600 mg)          Ampicillin 100 mg/ml          Clindamycin 150 mg/ml          Bactrim 80/400 mg/ml            Atopy Screen (Placed 10/18/21)    No Substance Readings (15 min) Evaluation   POS Histamine 1mg/ml ++    NEG NaCl 0.9% -      No Substance Readings (15 min) Evaluation   1 Alternaria alternata (tenuis)  -    2 Cladosporium herbarum -    3 Aspergillus fumigatus -    4 Penicillium notatum -    5 Dermatophagoides pteronyssinus -    6 Dermatophagoides farinae -    7 Dog epithelium (canis spp) -    8 Cat hair (mustapha catus) -    9 Cockroach   (Blatella americana & germanica) -    10 Grass mix midwest   (Carina, Orchard, Redtop, Rusty) -    11 Dez grass (sorghum halepense) -    12 Weed mix   (common Cocklebur, Lamb s quarters, rough redroot Pigweed, Dock/Sorrel) -    13 Mug wort (artemisia vulgare) -    14 Ragweed giant/short (ambrosia spp) -    15 White birch (Betula papyrifera) -    16 Tree mix 1 (Pecan, Maple BHR, Oak RVW, american [place], black [place]) -    17 Red cedar (juniperus virginia) -    18 Tree mix 2   (white Liang, river/red Birch, black [place], common [place], american Elm) -    19 Box elder/Maple mix (acer spp) -    20 [place] shagbark (carya ovata) -           Conclusion: no signs for atopy in prick tests      Intradermal Testing (Placed 10/18/21)    No Substance Conc.  Reading  (15min)  immediate Papule [mm] / Erythema [mm] Reading   (... days)  delayed Papule [mm] / Erythema [mm] Remarks   DF Standard Dust Mite - D. Farinae 1:10 -   -    DP Standard Dust Mite - D. Pteronyssinus 1:10 + 5/5  -    A Aspergillus fumigatus  1:10 -   -    P Penicillium notatum 1:10 -   -     Dog epithelium 1:10 +/++ 8/9  -    Conclusions: in intradermal tests some immediate type reaction to the dog epithelium and less to one of the house dust mites. We will read on Wednesday if delayed reaction    [] No relevant allergic reaction observed    [] Allergic reaction diagnosed against following allergens:      Interpretation/ remarks:   See later    [] Patient information given   [] ACDS CAMP information's (# ....) to following compounds: .....   [] General information's to following compounds: ......      ________________________________    Assessment & Plan:    ==> Final Diagnosis:     # atopic predisposition with    Seasonal RC in fall    Possible perennial RC and Bronchitis with morning sneezing = dust mites and dog in IDT    Recurrent dermatitis on feet and trunk DDx atopic dermatitis  * chronic illness with exacerbation, progression, side effects from treatment      # suspicion for allergic contact dermatitis on feet and maybe finger and trunk  * chronic illness with exacerbation, progression, side effects from treatment    # nail dystrophy (20 nail syndrome) DDx Lichen planus or allergic contact dermatitis  * chronic illness with exacerbation, progression, side effects from treatment     >> explained to patient that there is no direct contra-indication against the COVID vaccines, incl PEG      These conclusions are made at the best of one's knowledge and belief based on the provided evidence such as patient's history and allergy test results and they can change over time or can be incomplete because of missing information's.    ==> Treatment Plan:  Plan allergy tests  Patient should do COVID vaccine ASAP      Procedures Performed: Allergy tests, including prick, intradermal and patch tests    Staff: : provider    Follow-up in Derm-Allergy clinic for 1st readings of patch tests after 2 days (virtual) and 2nd readings and final conclusions after 4 days (in person)   ___________________________      I spent a total of 35 minutes with Viridiana Payan during today s  visit. This time was spent discussing all the individual test results, correlating them to the clinical relevance, counseling the patient and/or coordinating care and performing allergy tests            Again, thank you for allowing me to participate in the care of your patient.        Sincerely,        Christian Posey MD

## 2021-10-18 NOTE — NURSING NOTE
Dermatology Rooming Note    Viridiana Payan's goals for this visit include:   Chief Complaint   Patient presents with     Allergy Recheck     patch testing day 1     Nette Guajardo, CMA

## 2021-10-19 DIAGNOSIS — Z88.9 DRUG ALLERGY: ICD-10-CM

## 2021-10-19 RX ORDER — PENICILLIN G POTASSIUM 5000000 [IU]/1
5000000 INJECTION, POWDER, FOR SOLUTION INTRAMUSCULAR; INTRAVENOUS ONCE
Status: ACTIVE | OUTPATIENT
Start: 2021-10-20

## 2021-10-19 RX ORDER — AMPICILLIN 1 G/1
1 INJECTION, POWDER, FOR SOLUTION INTRAMUSCULAR; INTRAVENOUS ONCE
Status: ACTIVE | OUTPATIENT
Start: 2021-10-20

## 2021-10-19 RX ORDER — SULFAMETHOXAZOLE AND TRIMETHOPRIM 80; 16 MG/ML; MG/ML
80 INJECTION INTRAVENOUS ONCE
Status: ACTIVE | OUTPATIENT
Start: 2021-10-20

## 2021-10-19 NOTE — PROGRESS NOTES
Paged by patient as resident on call, reviewed records, patient had patch tests placed with Dr. Posey today 10/18, states that she is having a severe flare of an itchy rash on her feet no rash on the back, states she has no topical prescriptions to use on the feet, states the dermatitis on the feet were not addressed during dermatoallergology visit, some are almost forming blisters, patient was contemplating going to the ER to be evaluated, unclear nature of skin eruption and counseled on inability to determine best treatment without clinical photographs, documentation mentioned eczematous lesions on the phone examined during visit on patient's mobile device, sent triamcinolone 0.1% ointment to use twice daily as needed on the feet only, patient also taking Benadryl for itch as it is almost unbearable, advised to avoid systemic corticosteroids which may be administered by ED, patient to follow-up with Dr. Posey in 2 days    Alessandra Smith MD  Dermatology Resident  [facility]

## 2021-10-20 ENCOUNTER — OFFICE VISIT (OUTPATIENT)
Dept: ALLERGY | Facility: CLINIC | Age: 61
End: 2021-10-20
Payer: COMMERCIAL

## 2021-10-20 DIAGNOSIS — L23.89 ALLERGIC CONTACT DERMATITIS DUE TO OTHER AGENTS: ICD-10-CM

## 2021-10-20 DIAGNOSIS — J30.89 SEASONAL AND PERENNIAL ALLERGIC RHINOCONJUNCTIVITIS: ICD-10-CM

## 2021-10-20 DIAGNOSIS — L60.3 NAIL DYSTROPHY: ICD-10-CM

## 2021-10-20 DIAGNOSIS — H10.10 SEASONAL AND PERENNIAL ALLERGIC RHINOCONJUNCTIVITIS: ICD-10-CM

## 2021-10-20 DIAGNOSIS — Z88.9 DRUG ALLERGY: Primary | ICD-10-CM

## 2021-10-20 DIAGNOSIS — J30.2 SEASONAL AND PERENNIAL ALLERGIC RHINOCONJUNCTIVITIS: ICD-10-CM

## 2021-10-20 PROCEDURE — 95018 ALL TSTG PERQ&IQ DRUGS/BIOL: CPT | Performed by: DERMATOLOGY

## 2021-10-20 PROCEDURE — 95044 PATCH/APPLICATION TESTS: CPT | Performed by: DERMATOLOGY

## 2021-10-20 ASSESSMENT — PAIN SCALES - GENERAL: PAINLEVEL: NO PAIN (0)

## 2021-10-20 NOTE — LETTER
10/20/2021         RE: Viridiana Payan  [id number] [facility] [address]        Dear Colleague,    Thank you for referring your patient, Viridiana Payan, to the [facility] [place]. Please see a copy of my visit note below.    [facility] Dermato-allergology Note  Office visit  Encounter Date: Oct 20, 2021  ____________________________________________    CC: No chief complaint on file.      HPI:  (10/19/21)  Ms. Viridiana Payan is a(n) 61 year old female who presents today as a return patient for allergy consultation  - Follow-up in Derm-Allergy clinic for 1st readings of patch tests after 2 days   - otherwise feeling well in usual state of health    Physical exam:  General: In no acute distress, well-developed, well-nourished  Eyes: no conjunctivitis  ENT: no signs of rhinitis   Pulmonary: no wheezing or coughing  Skin:Focused examination of the skin on test sites was performed = see test results below    Earlier History and Allergy exams:  - Pruritic rash March 2021, back, feet, hands d/t new polyester comforter per patient. ~3 days. Patient has photos of rash,   - Seasonal allergies for >30 years, remote history of chronic bronchitis   - Seen by Dr. Santo 6/1/2021 and negative blood test   - Not on any allergy treatments. Used Claritin and Flonase in the past >5 years. No longer using them. Start in late summer, end mid-fall. Runny nose and congestion coughing. Worse at night.   - Chronic onychomycosis - fingers and toenails. Notes many fungal infections. Cultures were negative per visit with [facility]. Took po fluconazole for 3 years that did not clear it + nail picking.   - otherwise feeling well in usual state of health  - No history of asthma. No atopic dermatitis. No family history.   - Use all free and clear products. Using pacifica and lancome makeup brands.     >> some seasonal Bronchitis and improvement on Claritine = 20 years ago sensitization to  ragweed, dust, dogs    Skin: seen pictures in phone --> over the toes eczematous lesions dorsal to base and itchy, eczematous lesions on the back ==> worst in March    Past Medical History:   Patient Active Problem List   Diagnosis   (none) - all problems resolved or deleted     Past Medical History:   Diagnosis Date     ADHD (attention deficit hyperactivity disorder)      Allergic rhinitis      Asthma      Chemical dependency (H)     age 16     Depressive disorder      Knee injury     Work comp     Migraine      Smoker 10/2007    1ppd     Suicide attempt (H)     2 times with pills     Uterine fibroids affecting pregnancy        Allergies:  Allergies   Allergen Reactions     Clindamycin Hives     Codeine Itching     Fluticasone Other (See Comments)     Nose bleeds     Latex Other (See Comments)     Skin burning      Penicillins      Propoxyphene      Sulfa Drugs        Medications:  Current Outpatient Medications   Medication     azelastine (ASTELIN) 0.1 % nasal spray     Clindamycin Phosphate 600 MG/4ML SOLN     Ginkgo Biloba Extract 40 MG CAPS     Iodine, Kelp, (KELP PO)     Multiple Vitamins-Minerals (HAIR VITAMINS PO)     Omega-3 Fatty Acids (FISH OIL PO)     triamcinolone (KENALOG) 0.1 % external ointment     TURMERIC PO     Current Facility-Administered Medications   Medication     ampicillin (OMNIPEN) 1 g vial INTRADERMAL     penicillin g potassium [id number] unit vial INTRADERMAL     sulfamethoxazole-trimethoprim (BACTRIM) injection 80 mg       Social History:  The patient works at home at a [employer] center. Patient has the following hobbies or non-occupational exposure: none.     Family History:  Family History   Problem Relation Age of Onset     Chronic Obstructive Pulmonary Disease Father      Allergies Father         Seasonal     Breast Cancer Mother      Skin Cancer Brother      Rheumatoid Arthritis Maternal Aunt      Heart Disease Paternal Uncle      Bone Cancer Paternal Grandfather        Previous Labs,  Allergy Tests, Dermatopathology, Imaging:      Referred By: Karsten Santo MD  [address] [place] [address]     Allergy Tests:    Past Allergy Test    Order for Future Allergy Testing:    [x] Outpatient  [] Inpatient: [place]..../ Bed ....       Skin Atopy (atopic dermatitis) [] Yes   [x] No .........  Contact allergies:   [] Yes   [x] No ..........  Hand eczema:   [] Yes   [] No           Leading hand:   [] R   [] L       [] Ambidextrous         Drug allergies:        [] Yes   [x] No  Which?.Hives to Penicillins and Clindamycin    Urticaria/Angioedema  [] Yes   [x] No .........  Food Allergy:  [] Yes   [x] No  which?......  Pets :  [x] Yes   [] No  Which?..Chihuahua dog         [x]  Rhinitis   [x] Conjunctivitis   [] Sinusitis   [] Polyposis   [] Otitis   [] Pharyngitis         []  none  Operations:   [] Tonsils   [] Septum   [] Sinus   [] Polyposis        [x] Asthma bronchiale/Bronchitis   [] Coughing      []  none  Symptoms (mostly Rhinoconjunctivitis and Asthma) aggravated by:  Season   [] I   [] II   [] III   [] IV   []V   []VI   []VII   []VIII   [x]IX   [x]X   []XI   []XI     [x] perennial Bronchitis  Day time      [] morning   [] noon      [] evening        [] night    [] whole day........  []  none  Location/changes    [] inside        [] outside   [] mountains    [] sea     [] others.............   []  none  Triggers, specific     [] animals     [] plants     [] dust              [] others ...........................    []  none  Triggers, others       [] work          [] psyche    [] sport            [] others .............................  []  none  Irritant                [] phys efforts [] smoke    [] heat/cold     [] odors  []others............... []  none    Order for PATCH TESTS  Reason for tests (suspected allergy): recurrent dermatitis  Known previous allergies: none  Standardized panels  [x] Standard panel (40 tests)  [x] Preservatives & Antimicrobials (31 tests)  [x] Emulsifiers & Additives  (25 tests)   [x] Perfumes/Flavours & Plants (25 tests)  [] Hairdresser panel (12 tests)  [x] Rubber Chemicals (22 tests)  [x] Plastics (26 tests)  [] Colorants/Dyes/Food additives (20 tests)  [] Metals (implants/dental) (24 tests)  [] Local anaesthetics/NSAIDs (13 tests)  [x] Antibiotics & Antimycotics (14 tests)   [] Corticosteroids (15 tests)   [] Photopatch test (62 tests)   [] others: ...      [] Patient's own products: ...    DO NOT test if chemical or biological identity is unknown!     always ask from patient the product information and safety sheets (MSDS)       Order for PRICK TESTS    Reason for tests (suspected allergy): seasonal and probably perennial Rhinitis and Bronchitis  Known previous allergies: HDM and ragweed    Standardized prick panels  [x] Atopic panel (20 tests)  [] Pediatric Panel (12 tests)  [] Milk, Meat, Eggs, Grains (20 tests)   [] Dust, Epithelia, Feathers (10 tests)  [] Fish, Seafood, Shellfish (17 tests)  [] Nuts, Beans (14 tests)  [] Spice, Vegetable, Fruit (17 tests)  [] Pollen Panel = Tree, Grass, Weed (24 tests)  [] Others: ...      [] Patient's own products: ...    DO NOT test if chemical or biological identity is unknown!     always ask from patient the product information and safety sheets (MSDS)     Standardized intradermal tests  [x] Penicillium notatum [x] Aspergillus fumigatus [x] House dust mites D.far & D. pteron  [] Cat    [x] dog  [] Others: ...  [] Bee venom   [] Wasp venom  !!Specific protocol with dilutions!!       Order for Drug allergy tests (prick & Intradermal &  patch tests)    [x] Penicillin G  [x] Ampicillin [] Cefazolin   [] Ceftriaxone   [] Ceftazidime  [x] Bactrim    [x] Others: Clindamycine  Order for ... as test date      RESULTS & EVALUATION of PATCH TESTS    Patch test readings after     [x] 2 days, [] 3 days [x] 4 days, [] 5 days,   Other duration: ...    10/18/21 application of patch tests with results:    STANDARD Series        # Substance 2 days 4  days remarks    1 Fahad Mix [C] - -     2 Colophony - -     3  2-Mercaptobenzothiazole  - -      4 Methylisothiazolinone - -     5 Carba Mix - -     6 Thiuram Mix [A] - -     7 Bisphenol A Epoxy Resin - -     8 Q-Ggni-Exbjzfsyhmf-Formaldehyde Resin NA NA     9 Mercapto Mix [A] - -     10 Black Rubber Mix- PPD [B] - -     11 Potassium Dichromate  -  -     12 Balsam of [place] (Myroxylon Pereirae Resin) - -     13 Nickel Sulphate Hexahydrate - -     14 Mixed Dialkyl Thiourea - -     15 Paraben Mix [B] - -     16 Methyldibromo Glutaronitrile - -     17 Fragrance Mix - -     18 2-Bromo-2-Nitropropane-1,3-Diol (Bronopol) CT - -     19 Lyral - -     20 Tixocortol-21- Pivalate CT - -     21 Diazolidiyl Urea (Germall II) - -     22 Methyl Methacrylate - -     23 Cobalt (II) Chloride Hexahydrate - -     24 Fragrance Mix II  - -     25 Compositae Mix - -     26 Benzoyl Peroxide - -     27 Bacitracin - -     28 Formaldehyde - -     29 Methylchloroisothiazolinone / Methylisothiazolinone - -     30 Corticosteroid Mix CT NA NA     31 Sodium Lauryl Sulfate - -     32 Lanolin Alcohol - -     33 Turpentine - -     34 Cetylstearylalcohol - -     35 Chlorhexidine Dicluconate + - Or pustule    36 Budenoside NA NA     37 Imidazolidinyl Urea  NA NA     38 Ethyl-2 Cyanoacrylate NA NA     39 Quaternium 15 (Dowicil 200) - -     40 Decyl Glucoside - -     PRESERVATIVES & ANTIMICROBIALS        # Substance 2 days 4 days remarks   41 1  1,2-Benzisothiazoline-3-One, Sodium Salt - -    42 2  1,3,5-Alok (2-Hydroxyethyl) - Hexahydrotriazine (Grotan BK) - -    43 3 0-Sgxyqbkkmwbja-7-Nitro-1, 3-Propanediol NA NA    44 4  3, 4, 4' - Triclocarban - -    45 5 4 - Chloro - 3 - Cresol - -    46 6 4 - Chloro - 4 - Xylenol (PCMX) - -    47 7 7-Ethylbicyclooxazolidine (Bioban NX5555) - -    48 8 Benzalkonium Chloride CT - -    49 9 Benzyl Alcohol - -    50 10 Cetalkonium Chloride - -    51 11 Cetylpyrimidine Chloride  - -    52 12 Chloroacetamide - -    53 13  DMDM Hydantoin - -    54 14 Glutaraldehyde - -    55 15 Triclosan - -    56 16 Glyoxal Trimeric Dihydrate - -    57 17 Iodopropynyl Butylcarbamate - -    58 18 Octylisothiazoline NA NA    59 19 Bithionol CT - -    60 20 Bioban P 1487 (Nitrobutyl) Morpholine/(Ethylnitro-Trimethylene) Dimorpholine +/++ -    61 21 Phenoxyethanol NA NA    62 22 Phenyl Salicylate - -    63 23 Povidone Iodine - -    64 24 Sodium Benzoate - -    65 25 Sodium Disulfite NA NA    66 26 Sorbic Acid - -    67 27 Thimerosal      68 28 Melamine Formaldehyde Resin      69 29 Ethylenediamine Dihydrochloride        Parabens      70 30 Butyl-P-Hydroxybenzoate NA NA    71 31 Ethyl-P-Hydroxybenzoate - -    72 32 Methyl-P-Hydroxybenzoate - -    73 33 Propyl-P-Hydroxybenzoate - -     EMULSIFIERS & ADDITIVES       # Substance 2 days 4 days remarks   74 1 Polyethylene Glycol-400 - -    75 2 Cocamidopropyl Betaine - -    76 3 Amerchol L101 - -    77 4 Propylene Glycol - -    78 5 Triethanolamine - -    79 6 Sorbitane Sesquiolate CT - -    80 7 Isopropylmyristate - -    81 8 Polysorbate 80 CT - -    82 9 Amidoamine   (Stearamidopropyl Dimethylamine) - -    83 10 Oleamidopropyl Dimethylamine - -    84 11 Lauryl Glucoside NA NA    85 12 Coconut Diethanolamide  - -    86 13 2-Hydroxy-4-Methoxy Benzophenone (Oxybenzone) - -    87 14 Benzophenone-4 (Sulisobenzon) NA NA    88 15 Propolis - -    89 16 Dexpanthenol NA NA    90 17 Carboxymethyl Cellulose Sodium NA NA    91 18 Abitol - -    92 19 Tert-Butylhydroquinone - -    93 20 Benzyl Salicylate - -    94 21 Dimethylaminopropylamin (DMPA) CT? D053      95 22 Zinc Pyrithione (Zinc Omadine) CT? Z006      96 23 Alok(Hydroxymethyl) Nitromethane CT        Antioxidant - -    97 24 Dodecyl Gallate - -    98 25 Butylhydroxyanisole (BHA) - -    99 26 Butylhydroxytoluene (BHT) - -    100 27 Di-Alpha-Tocopherol (Vit E) - -    101 28 Propyl Gallate - -     PERFUMES, FLAVORS & PLANTS        # Substance 2 days 4 days remarks    102 1 Benzyl Cinnamate NA NA    103 2 Di-Limonene (Dipentene) - -    104 3 Cananga Odorata (Collin Polanco) (I) - -    105 4 Lichen Acid Mix - -    106 5 Mentha Piperita Oil (Peppermint Oil) - -    107 6 Sesquiterpenelactone mix - -    108 7 Tea Tree Oil, Oxidized - -    109 8 Wood Tar Mix + -    110 9 Abietic Acid - -    111 10 Lavendula Angustifolia Oil (Lavender Oil) - -    112 11 Fragrance mix II CT * - -      Fragrance Mix I      113 12 Oakmoss Absolute + -    114 13 Eugenol - -    115 14 Geraniol - -    116 15 Hydroxycitronellal - -    117 16 Isoeugenol - -    118 17 Cinnamic Aldehyde - -    119 18 Cinnamic Alcohol  - -      Fragrance mix II      120 19 Citronellol - -    121 20 Alpha-Hexylcinnamic Aldehyde    - -    122 21 Citral NA NA    123 22 Farnesol - -    124 23 Coumarin - -      Hexylcinnamic aldehyde, Coumarin, Farnesol, Hydroxyisohexy3-cyclohexene carboxaldehyde, citral, citrolellol   RUBBER CHEMICALS        # Substance 2 days 4 days remarks     Carbamate      125 1 Zinc Bis ( Diethyldithio carbamate ) (ZDEC) - -     2 Zinc Bis (Dibutyldithiocarbamate) - -     3 1,3-Diphenylguanidine (DPG) - -      Thiourea       4 Dibutylthiourea - -     5 Diphenyltiourea - -    130 6 Thiourea NA NA      Mercapto chemicals       7 Morpholinyl Mercaptobenzothiazole - -     8 E-Mhfzgdfkba-6-Benzothiazyl-Sulfenamide - -     9 Dibenzothiazyl Disulfide - -      Thiuram chemicals       10 Dipentamethylenethiuram Disulfide - -    135 11 Tetraethylthiuram Disulfide (Disulfiram) - -     12 Tetramethylthiuram Disulfide - -     13 Tetramethyl Thiuram Monosulfide (TMTM) - -      4-Phenylenediamine derivatives       14 N-Isopropyl-N'-Phenyl-P-Phenylenediamine (IPPD) - -     15 Uvibmxwr-V-Srjwykxpqrzhcrod (DPPD) - -      Various Rubber Additives      140 16 Hydroquinone Monobenzylether  - -     17 Hexamethylenetetramine - -     18 4,4'-Dihydroxybiphenyl - -     19 Cyclohexylthiophtalimide - -     20 N-Phenyl-B-Naphthylamine - -     145 21 Dodecyl Mercaptan - -      PLASTICS        # Substance 2 days 4 days remarks     Acrylates - -    146 1 2-Hydroxyethyl Methacrylate (HEMA) +++ -     2 1,4-Butandioldimethacrylate (BUDMA) - -     3  2-Ethylhexyl Acrylate - -     4 Bisphenol-A-Dimethacrylate  - -    150 5 Diurethane-Dimethacrylate NA NA     6 Ethyleneglycoldimethacrylate (EGDMA) +++ -     7 Pentaerythritoltriacrylate (TYLER) - -     8 Triethylene Glycol Dimethacrylate (TEGDMA) + -      Synthetic material/additives        9 J-Zjhz-Xbwsslwmeth - -    155 10 Tricresyl Phosphate NA NA     11 7-Jeyu-Khzjpvgbmzfvp - -     12 Bis (2-Ethylhexyl) Phthalate - -     13 Dibutylphthalate - -     14 Dimethylphthalate - -    160 15 Toluene-2,4-Diisocyanate NA NA     16 Diphenylmethane-4,4''-Diisocyanate - -      EPOXY RESIN SYSTEMS        Reactive Solvents - -     17 Cresyl Glycidyl Ether - -     18 Butyl Glycidyl Ether - -     19 Phenyl Glycidyl Ether - -    165 20 1,4-Butanediol Diglycidyl Ether - -     21 1,6-Hexanediole Diglycidyl Ether - -      Hardener / Accelerator - -     22 Triethylenetetramine - -     23 Diethylenetriamine - -     24 Isophorone Diamine (IPD) - -    170 25 N,N-Dimethyl-P-Toluidine - -     26 3-gtwy-Kiiozftpxrjcx (antioxidant/stabilizer) CT - -    172 27 6-pggw-Lcqyffsfqefsacdfwbqzzoa resin PTBP CT  - -    ANTIBIOTICS & ANTIMYCOTICS    # Substance 2 days 4 days remarks   173 1 Erythromycine - -     2 Framycetine Sulphate - -    175 3 Fusidic Acid Sodium Salt - -     4 Gentamicin Sulphate - -     5 Neomycine Sulphate - -     6 Oxytetracycline  - -     7 Polymyxin B Sulphate - -    180 8 Tetracycline-HCL - -     9 Sulfanilamide - -     10 Metronidazole - -     11 Oxyquinoline Mix - -     12 Nitrofurazone - -    185 13 Nystatin - -    186 14 Clotrimazole - -      Results of patch tests:                         Interpretation:  - Negative                    A    = Allergic      (+) Erythema    TI   = Toxic/irritant   + E +  Infiltration    RaP = Relevance at Present     ++ E/I + Papulovesicle   Rpr  = Relevance Previously     +++ E/I/P + Blister     nR   = No Relevance    DRUG ALLERGY TEST SERIES 10/20/2021        Prick Tests         Substance/ Allergen Conc Result (20 min) Remarks    Histamine Hydrochloride (ALK) 0.1 mg/ml +     NaCl 0.9% -     Benzylpenicillin (Penicillin G) 1 Stanton IU/ml (600 mg) -     Ampicillin 100mg/ml -     Clindamycin 150 mg/ml -     Bactrim 80/400 mg/ml -       Intradermal Tests   immed immed delay delay      Substance Conc 1st dil  2nd dil  days  days remarks    NaCl 0.9%         Benzylpenicillin (Penicillin G) 1:100 -        Ampicillin 1:10 -        Clindamycin 1:10 -        Bactrim 1:100 -           Patch Tests  as is as is 1:2 1:2     As Is  Vas Substance Conc days days days days remarks     Benzylpenicillin (Penicillin G) 1 Stanton IU/ml (600 mg)          Ampicillin 100 mg/ml          Clindamycin 150 mg/ml          Bactrim 80/400 mg/ml            Atopy Screen (Placed 10/18/21)    No Substance Readings (15 min) Evaluation   POS Histamine 1mg/ml ++    NEG NaCl 0.9% -      No Substance Readings (15 min) Evaluation   1 Alternaria alternata (tenuis)  -    2 Cladosporium herbarum -    3 Aspergillus fumigatus -    4 Penicillium notatum -    5 Dermatophagoides pteronyssinus -    6 Dermatophagoides farinae -    7 Dog epithelium (canis spp) -    8 Cat hair (mustapha catus) -    9 Cockroach   (Blatella americana & germanica) -    10 Grass mix Camp   (June, Orchard, Redtop, Rusty) -    11 Dez grass (sorghum halepense) -    12 Weed mix   (common Cocklebur, Lamb s quarters, rough redroot Pigweed, Dock/Sorrel) -    13 Mug wort (artemisia vulgare) -    14 Ragweed giant/short (ambrosia spp) -    15 White birch (Betula papyrifera) -    16 Tree mix 1 (Pecan, Maple BHR, Oak RVW, american Casey, black Edgewood) -    17 Red cedar (juniperus virginia) -    18 Tree mix 2   (white Liang, river/red Birch, black [place], common  Trego, american Elm) -    19 Box elder/Maple mix (acer spp) -    20 Fayette shagbark (carya ovata) -           Conclusion: no signs for atopy in prick tests      Intradermal Testing (Placed 10/18/21)    No Substance Conc.  Reading (15min)  immediate Papule [mm] / Erythema [mm] Reading   (... days)  delayed Papule [mm] / Erythema [mm] Remarks   DF Standard Dust Mite - D. Farinae 1:10 -   -    DP Standard Dust Mite - D. Pteronyssinus 1:10 + 5/5  -    A Aspergillus fumigatus  1:10 -   -    P Penicillium notatum 1:10 -   -     Dog epithelium 1:10 +/++ 8/9  -    Conclusions: in intradermal tests some immediate type reaction to the dog epithelium and less to one of the house dust mites. We will read on Wednesday if delayed reaction    [] No relevant allergic reaction observed    [x] Allergic reaction diagnosed against following allergens:   Acrylates: +++ 2-Hydroxyethyl Methacrylate (HEMA), +++ Ethyleneglycoldimethacrylate (EGDMA), + Triethylene Glycol Dimethacrylate (TEGDMA)  Preservatives/disinfectants: + Chlorhexidine Dicluconate, +/++ Bioban P 1487 (Nitrobutyl) Morpholine/(Ethylnitro-Trimethylene) Dimorpholine   Fragrances: + Wood Tar Mix, + Oakmoss abosolute      Interpretation/ remarks:   See later    [] Patient information given   [] ACDS CAMP information's (# ....) to following compounds: .....   [] General information's to following compounds: ......      ________________________________    Assessment & Plan:    ==> Final Diagnosis:     # atopic predisposition with    Seasonal RC in fall    Possible perennial RC and Bronchitis with morning sneezing = dust mites and dog in IDT    Recurrent dermatitis on feet and trunk DDx atopic dermatitis  * chronic illness with exacerbation, progression, side effects from treatment      # suspicion for allergic contact dermatitis on feet and maybe finger and trunk  * chronic illness with exacerbation, progression, side effects from treatment    # nail dystrophy (20 nail  syndrome) DDx Lichen planus or allergic contact dermatitis  * chronic illness with exacerbation, progression, side effects from treatment     # unclear allergy to multiple drugs (Penicillin G, Sulfonamides, Clinamycine)    No signs for immediate type reaction to these antibiotics  * chronic illness with exacerbation, progression, side effects from treatment    >> explained to patient that there is no direct contra-indication against the COVID vaccines, incl PEG      These conclusions are made at the best of one's knowledge and belief based on the provided evidence such as patient's history and allergy test results and they can change over time or can be incomplete because of missing information's.    ==> Treatment Plan:  Plan allergy tests  Patient should do COVID vaccine ASAP     ___________________________      Procedures Performed: Allergy tests, including drug allergy tests    Staff: : provider      Follow-up in Derm-Allergy clinic for 2nd readings and final conclusions after 4 days (in person)   ___________________________      I spent a total of 28 minutes with Viridiana Payan during today s  visit. This time was spent discussing all the individual test results, correlating them to the clinical relevance, counseling the patient and/or coordinating care and performing allergy tests or procedures.           Again, thank you for allowing me to participate in the care of your patient.        Sincerely,        Christian Posey MD

## 2021-10-20 NOTE — NURSING NOTE
Chief Complaint   Patient presents with     Allergy Testing Followup     Viridiana is here for Patch Testing Day 3 and Drug testing     Anthoyn Noeedic

## 2021-10-21 NOTE — PROGRESS NOTES
[facility] Dermato-allergology Note  Office visit  Encounter Date: Oct 22, 2021  ____________________________________________    CC: No chief complaint on file.      HPI:  (10/20/21)  Ms. Viridiana Payan is a(n) 61 year old female who presents today as a return patient for allergy consultation  - Follow-up in Derm-Allergy clinic for 2nd readings and final conclusions after 4 days (in person)   - otherwise feeling well in usual state of health    Physical exam:  General: In no acute distress, well-developed, well-nourished  Eyes: no conjunctivitis  ENT: no signs of rhinitis   Pulmonary: no wheezing or coughing  Skin:Focused examination of the skin on test sites was performed = see test results below    Earlier History and Allergy exams:    - Pruritic rash March 2021, back, feet, hands d/t new polyester comforter per patient. ~3 days. Patient has photos of rash,   - Seasonal allergies for >30 years, remote history of chronic bronchitis   - Seen by Dr. Santo 6/1/2021 and negative blood test   - Not on any allergy treatments. Used Claritin and Flonase in the past >5 years. No longer using them. Start in late summer, end mid-fall. Runny nose and congestion coughing. Worse at night.   - Chronic onychomycosis - fingers and toenails. Notes many fungal infections. Cultures were negative per visit with Nisha. Took po fluconazole for 3 years that did not clear it + nail picking.   - otherwise feeling well in usual state of health  - No history of asthma. No atopic dermatitis. No family history.   - Use all free and clear products. Using pacifica and lancome makeup brands.     >> some seasonal Bronchitis and improvement on Claritine = 20 years ago sensitization to ragweed, dust, dogs    Skin: seen pictures in phone --> over the toes eczematous lesions dorsal to base and itchy, eczematous lesions on the back ==> worst in March    Past Medical History:   Patient Active Problem List   Diagnosis   (none) -  all problems resolved or deleted     Past Medical History:   Diagnosis Date     ADHD (attention deficit hyperactivity disorder)      Allergic rhinitis      Asthma      Chemical dependency (H)     age 16     Depressive disorder      Knee injury     Work comp     Migraine      Smoker 10/2007    1ppd     Suicide attempt (H)     2 times with pills     Uterine fibroids affecting pregnancy        Allergies:  Allergies   Allergen Reactions     Clindamycin Hives     Codeine Itching     Fluticasone Other (See Comments)     Nose bleeds     Latex Other (See Comments)     Skin burning      Penicillins      Propoxyphene      Sulfa Drugs        Medications:  Current Outpatient Medications   Medication     azelastine (ASTELIN) 0.1 % nasal spray     Clindamycin Phosphate 600 MG/4ML SOLN     Ginkgo Biloba Extract 40 MG CAPS     Iodine, Kelp, (KELP PO)     Multiple Vitamins-Minerals (HAIR VITAMINS PO)     Omega-3 Fatty Acids (FISH OIL PO)     triamcinolone (KENALOG) 0.1 % external ointment     TURMERIC PO     Current Facility-Administered Medications   Medication     ampicillin (OMNIPEN) 1 g vial INTRADERMAL     ampicillin (OMNIPEN) 1 g vial INTRADERMAL     penicillin g potassium [id number] unit vial INTRADERMAL     penicillin g potassium [id number] unit vial INTRADERMAL     sulfamethoxazole-trimethoprim (BACTRIM) injection 80 mg     sulfamethoxazole-trimethoprim (BACTRIM) injection 80 mg       Social History:  The patient works at home at a [employer]. Patient has the following hobbies or non-occupational exposure: none.     Family History:  Family History   Problem Relation Age of Onset     Chronic Obstructive Pulmonary Disease Father      Allergies Father         Seasonal     Breast Cancer Mother      Skin Cancer Brother      Rheumatoid Arthritis Maternal Aunt      Heart Disease Paternal Uncle      Bone Cancer Paternal Grandfather        Previous Labs, Allergy Tests, Dermatopathology, Imaging:      Referred By: Karsten Santo MD  [id number]  [place] [address]     Allergy Tests:    Past Allergy Test    Order for Future Allergy Testing:    [x] Outpatient  [] Inpatient: [place]..../ Bed ....       Skin Atopy (atopic dermatitis) [] Yes   [x] No .........  Contact allergies:   [] Yes   [x] No ..........  Hand eczema:   [] Yes   [] No           Leading hand:   [] R   [] L       [] Ambidextrous         Drug allergies:        [] Yes   [x] No  Which?.Hives to Penicillins and Clindamycin    Urticaria/Angioedema  [] Yes   [x] No .........  Food Allergy:  [] Yes   [x] No  which?......  Pets :  [x] Yes   [] No  Which?..Chihuahua dog         [x]  Rhinitis   [x] Conjunctivitis   [] Sinusitis   [] Polyposis   [] Otitis   [] Pharyngitis         []  none  Operations:   [] Tonsils   [] Septum   [] Sinus   [] Polyposis        [x] Asthma bronchiale/Bronchitis   [] Coughing      []  none  Symptoms (mostly Rhinoconjunctivitis and Asthma) aggravated by:  Season   [] I   [] II   [] III   [] IV   []V   []VI   []VII   []VIII   [x]IX   [x]X   []XI   []XI     [x] perennial Bronchitis  Day time      [] morning   [] noon      [] evening        [] night    [] whole day........  []  none  Location/changes    [] inside        [] outside   [] mountains    [] sea     [] others.............   []  none  Triggers, specific     [] animals     [] plants     [] dust              [] others ...........................    []  none  Triggers, others       [] work          [] psyche    [] sport            [] others .............................  []  none  Irritant                [] phys efforts [] smoke    [] heat/cold     [] odors  []others............... []  none    Order for PATCH TESTS  Reason for tests (suspected allergy): recurrent dermatitis  Known previous allergies: none  Standardized panels  [x] Standard panel (40 tests)  [x] Preservatives & Antimicrobials (31 tests)  [x] Emulsifiers & Additives (25 tests)   [x] Perfumes/Flavours & Plants (25 tests)  [] Hairdresser panel (12  tests)  [x] Rubber Chemicals (22 tests)  [x] Plastics (26 tests)  [] Colorants/Dyes/Food additives (20 tests)  [] Metals (implants/dental) (24 tests)  [] Local anaesthetics/NSAIDs (13 tests)  [x] Antibiotics & Antimycotics (14 tests)   [] Corticosteroids (15 tests)   [] Photopatch test (62 tests)   [] others: ...      [] Patient's own products: ...    DO NOT test if chemical or biological identity is unknown!     always ask from patient the product information and safety sheets (MSDS)       Order for PRICK TESTS    Reason for tests (suspected allergy): seasonal and probably perennial Rhinitis and Bronchitis  Known previous allergies: HDM and ragweed    Standardized prick panels  [x] Atopic panel (20 tests)  [] Pediatric Panel (12 tests)  [] Milk, Meat, Eggs, Grains (20 tests)   [] Dust, Epithelia, Feathers (10 tests)  [] Fish, Seafood, Shellfish (17 tests)  [] Nuts, Beans (14 tests)  [] Spice, Vegetable, Fruit (17 tests)  [] Pollen Panel = Tree, Grass, Weed (24 tests)  [] Others: ...      [] Patient's own products: ...    DO NOT test if chemical or biological identity is unknown!     always ask from patient the product information and safety sheets (MSDS)     Standardized intradermal tests  [x] Penicillium notatum [x] Aspergillus fumigatus [x] House dust mites D.far & D. pteron  [] Cat    [x] dog  [] Others: ...  [] Bee venom   [] Wasp venom  !!Specific protocol with dilutions!!       Order for Drug allergy tests (prick & Intradermal &  patch tests)    [x] Penicillin G  [x] Ampicillin [] Cefazolin   [] Ceftriaxone   [] Ceftazidime  [x] Bactrim    [x] Others: Clindamycine  Order for ... as test date      RESULTS & EVALUATION of PATCH TESTS    Patch test readings after     [x] 2 days, [] 3 days [x] 4 days, [] 5 days,   Other duration: ...    10/18/21 application of patch tests with results:    STANDARD Series        # Substance 2 days 4 days remarks    1 Fahad Mix [C] - -     2 Colophony - -     3   2-Mercaptobenzothiazole  - -      4 Methylisothiazolinone - -     5 Carba Mix - -     6 Thiuram Mix [A] - -     7 Bisphenol A Epoxy Resin - -     8 U-Wvbw-Nofuzljqnam-Formaldehyde Resin NA NA     9 Mercapto Mix [A] - -     10 Black Rubber Mix- PPD [B] - -     11 Potassium Dichromate  -  -     12 Balsam of [place] (Myroxylon Pereirae Resin) - -     13 Nickel Sulphate Hexahydrate - -     14 Mixed Dialkyl Thiourea - -     15 Paraben Mix [B] - -     16 Methyldibromo Glutaronitrile - -     17 Fragrance Mix - -     18 2-Bromo-2-Nitropropane-1,3-Diol (Bronopol) CT - -     19 Lyral - -     20 Tixocortol-21- Pivalate CT - -     21 Diazolidiyl Urea (Germall II) - -     22 Methyl Methacrylate - -     23 Cobalt (II) Chloride Hexahydrate - -     24 Fragrance Mix II  - -     25 Compositae Mix - -     26 Benzoyl Peroxide - -     27 Bacitracin - -     28 Formaldehyde - -     29 Methylchloroisothiazolinone / Methylisothiazolinone - -     30 Corticosteroid Mix CT NA NA     31 Sodium Lauryl Sulfate - -     32 Lanolin Alcohol - -     33 Turpentine - -     34 Cetylstearylalcohol - -     35 Chlorhexidine Dicluconate + - Or pustule    36 Budenoside NA NA     37 Imidazolidinyl Urea  NA NA     38 Ethyl-2 Cyanoacrylate NA NA     39 Quaternium 15 (Dowicil 200) - -     40 Decyl Glucoside - -     PRESERVATIVES & ANTIMICROBIALS        # Substance 2 days 4 days remarks   41 1  1,2-Benzisothiazoline-3-One, Sodium Salt - -    42 2  1,3,5-Alok (2-Hydroxyethyl) - Hexahydrotriazine (Grotan BK) - -    43 3 0-Sytbgsykqjpbc-4-Nitro-1, 3-Propanediol NA NA    44 4  3, 4, 4' - Triclocarban - -    45 5 4 - Chloro - 3 - Cresol - -    46 6 4 - Chloro - 4 - Xylenol (PCMX) - -    47 7 7-Ethylbicyclooxazolidine (Bioban AF9188) - -    48 8 Benzalkonium Chloride CT - -    49 9 Benzyl Alcohol - -    50 10 Cetalkonium Chloride - -    51 11 Cetylpyrimidine Chloride  - -    52 12 Chloroacetamide - -    53 13 DMDM Hydantoin - -    54 14 Glutaraldehyde - -    55 15  Triclosan - -    56 16 Glyoxal Trimeric Dihydrate - -    57 17 Iodopropynyl Butylcarbamate - -    58 18 Octylisothiazoline NA NA    59 19 Bithionol CT - -    60 20 Bioban P 1487 (Nitrobutyl) Morpholine/(Ethylnitro-Trimethylene) Dimorpholine +/++ -    61 21 Phenoxyethanol NA NA    62 22 Phenyl Salicylate - -    63 23 Povidone Iodine - -    64 24 Sodium Benzoate - -    65 25 Sodium Disulfite NA NA    66 26 Sorbic Acid - -    67 27 Thimerosal      68 28 Melamine Formaldehyde Resin      69 29 Ethylenediamine Dihydrochloride        Parabens      70 30 Butyl-P-Hydroxybenzoate NA NA    71 31 Ethyl-P-Hydroxybenzoate - -    72 32 Methyl-P-Hydroxybenzoate - -    73 33 Propyl-P-Hydroxybenzoate - -     EMULSIFIERS & ADDITIVES       # Substance 2 days 4 days remarks   74 1 Polyethylene Glycol-400 - -    75 2 Cocamidopropyl Betaine - -    76 3 Amerchol L101 - -    77 4 Propylene Glycol - -    78 5 Triethanolamine - -    79 6 Sorbitane Sesquiolate CT - -    80 7 Isopropylmyristate - -    81 8 Polysorbate 80 CT - -    82 9 Amidoamine   (Stearamidopropyl Dimethylamine) - -    83 10 Oleamidopropyl Dimethylamine - -    84 11 Lauryl Glucoside NA NA    85 12 Coconut Diethanolamide  - -    86 13 2-Hydroxy-4-Methoxy Benzophenone (Oxybenzone) - -    87 14 Benzophenone-4 (Sulisobenzon) NA NA    88 15 Propolis - -    89 16 Dexpanthenol NA NA    90 17 Carboxymethyl Cellulose Sodium NA NA    91 18 Abitol - -    92 19 Tert-Butylhydroquinone - +    93 20 Benzyl Salicylate - -    94 21 Dimethylaminopropylamin (DMPA) CT? D053      95 22 Zinc Pyrithione (Zinc Omadine) CT? Z006      96 23 Alok(Hydroxymethyl) Nitromethane CT        Antioxidant - -    97 24 Dodecyl Gallate - -    98 25 Butylhydroxyanisole (BHA) - -    99 26 Butylhydroxytoluene (BHT) - -    100 27 Di-Alpha-Tocopherol (Vit E) - -    101 28 Propyl Gallate - -     PERFUMES, FLAVORS & PLANTS        # Substance 2 days 4 days remarks   102 1 Benzyl Cinnamate NA NA    103 2 Di-Limonene  (Dipentene) - -    104 3 [place] Odorata (Collin Polanco) (I) - -    105 4 Lichen Acid Mix - -    106 5 Mentha Piperita Oil (Peppermint Oil) - -    107 6 Sesquiterpenelactone mix - -    108 7 Tea Tree Oil, Oxidized - -    109 8 Wood Tar Mix + -    110 9 Abietic Acid - -    111 10 Lavendula Angustifolia Oil (Lavender Oil) - -    112 11 Fragrance mix II CT * - -      Fragrance Mix I      113 12 [place] Absolute + -    114 13 Eugenol - -    115 14 Geraniol - -    116 15 Hydroxycitronellal - -    117 16 Isoeugenol - -    118 17 Cinnamic Aldehyde - -    119 18 Cinnamic Alcohol  - -      Fragrance mix II      120 19 Citronellol - -    121 20 Alpha-Hexylcinnamic Aldehyde    - -    122 21 Citral NA NA    123 22 Farnesol - -    124 23 Coumarin - -      Hexylcinnamic aldehyde, Coumarin, Farnesol, Hydroxyisohexy3-cyclohexene carboxaldehyde, citral, citrolellol   RUBBER CHEMICALS        # Substance 2 days 4 days remarks     Carbamate      125 1 Zinc Bis ( Diethyldithio carbamate ) (ZDEC) - -     2 Zinc Bis (Dibutyldithiocarbamate) - -     3 1,3-Diphenylguanidine (DPG) - -      Thiourea       4 Dibutylthiourea - -     5 Diphenyltiourea - -    130 6 Thiourea NA NA      Mercapto chemicals       7 Morpholinyl Mercaptobenzothiazole - -     8 M-Xovglefcdi-0-Benzothiazyl-Sulfenamide - -     9 Dibenzothiazyl Disulfide - -      Thiuram chemicals       10 Dipentamethylenethiuram Disulfide - -    135 11 Tetraethylthiuram Disulfide (Disulfiram) - -     12 Tetramethylthiuram Disulfide - -     13 Tetramethyl Thiuram Monosulfide (TMTM) - -      4-Phenylenediamine derivatives       14 N-Isopropyl-N'-Phenyl-P-Phenylenediamine (IPPD) - -     15 Suljspcv-W-Jhqhzkcmcpmbfiwd (DPPD) - -      Various Rubber Additives      140 16 Hydroquinone Monobenzylether  - -     17 Hexamethylenetetramine - -     18 4,4'-Dihydroxybiphenyl - -     19 Cyclohexylthiophtalimide - -     20 N-Phenyl-B-Naphthylamine - -    145 21 Dodecyl Mercaptan - -      PLASTICS        #  Substance 2 days 4 days remarks     Acrylates - -    146 1 2-Hydroxyethyl Methacrylate (HEMA) +++ +++     2 1,4-Butandioldimethacrylate (BUDMA) - +     3  2-Ethylhexyl Acrylate - -     4 Bisphenol-A-Dimethacrylate  - -    150 5 Diurethane-Dimethacrylate NA NA     6 Ethyleneglycoldimethacrylate (EGDMA) +++ +++     7 Pentaerythritoltriacrylate (TYLER) - -     8 Triethylene Glycol Dimethacrylate (TEGDMA) + +      Synthetic material/additives        9 G-Jbwd-Bojsfszzmov - -    155 10 Tricresyl Phosphate NA NA     11 8-Pklu-Jkpinvycxpppu - -     12 Bis (2-Ethylhexyl) Phthalate - -     13 Dibutylphthalate - -     14 Dimethylphthalate - -    160 15 Toluene-2,4-Diisocyanate NA NA     16 Diphenylmethane-4,4''-Diisocyanate - -      EPOXY RESIN SYSTEMS        Reactive Solvents - -     17 Cresyl Glycidyl Ether - -     18 Butyl Glycidyl Ether - -     19 Phenyl Glycidyl Ether - -    165 20 1,4-Butanediol Diglycidyl Ether - -     21 1,6-Hexanediole Diglycidyl Ether - -      Hardener / Accelerator - -     22 Triethylenetetramine - -     23 Diethylenetriamine - -     24 Isophorone Diamine (IPD) - -    170 25 N,N-Dimethyl-P-Toluidine - -     26 0-elvj-Yitnxafjujeff (antioxidant/stabilizer) CT - -    172 27 9-yglt-Rmwbqkmmbqsxkmlygritwaz resin PTBP CT  - -    ANTIBIOTICS & ANTIMYCOTICS    # Substance 2 days 4 days remarks   173 1 Erythromycine - -     2 Framycetine Sulphate - -    175 3 Fusidic Acid Sodium Salt - -     4 Gentamicin Sulphate - -     5 Neomycine Sulphate - -     6 Oxytetracycline  - -     7 Polymyxin B Sulphate - -    180 8 Tetracycline-HCL - -     9 Sulfanilamide - -     10 Metronidazole - -     11 Oxyquinoline Mix - -     12 Nitrofurazone - -    185 13 Nystatin - -    186 14 Clotrimazole - -      Results of patch tests:                         Interpretation:  - Negative                    A    = Allergic      (+) Erythema    TI   = Toxic/irritant   + E + Infiltration    RaP = Relevance at Present     ++ E/I +  Papulovesicle   Rpr  = Relevance Previously     +++ E/I/P + Blister     nR   = No Relevance    DRUG ALLERGY TEST SERIES 10/20/2021        Prick Tests         Substance/ Allergen Conc Result (20 min) Remarks    Histamine Hydrochloride (ALK) 0.1 mg/ml +     NaCl 0.9% -     Benzylpenicillin (Penicillin G) 1 [place] IU/ml (600 mg) -     Ampicillin 100mg/ml -     Clindamycin 150 mg/ml -     Bactrim 80/400 mg/ml -       Intradermal Tests   immed immed delay delay      Substance Conc 1st dil  2nd dil  days  days remarks    NaCl 0.9%         Benzylpenicillin (Penicillin G) 1:100 -        Ampicillin 1:10 -        Clindamycin 1:10 -        Bactrim 1:100 -           Patch Tests  as is as is 1:2 1:2     As Is  Vas Substance Conc 2 days 4 days 2 days 4 days remarks     Benzylpenicillin (Penicillin G) 1 [place] IU/ml (600 mg) - - - -      Ampicillin 100 mg/ml - - - -      Clindamycin 150 mg/ml - - - -      Bactrim 80/400 mg/ml - - - -        Atopy Screen (Placed 10/18/21)    No Substance Readings (15 min) Evaluation   POS Histamine 1mg/ml ++    NEG NaCl 0.9% -      No Substance Readings (15 min) Evaluation   1 Alternaria alternata (tenuis)  -    2 Cladosporium herbarum -    3 Aspergillus fumigatus -    4 Penicillium notatum -    5 Dermatophagoides pteronyssinus -    6 Dermatophagoides farinae -    7 Dog epithelium (canis spp) -    8 Cat hair (mustapha catus) -    9 Cockroach   (Blatella americana & germanica) -    10 Grass mix midwest   (Carina, Orchard, Redtop, Rusty) -    11 Dez grass (sorghum halepense) -    12 Weed mix   (common Cocklebur, Lamb s quarters, rough redroot Pigweed, Dock/Sorrel) -    13 Mug wort (artemisia vulgare) -    14 Ragweed giant/short (ambrosia spp) -    15 White birch (Betula papyrifera) -    16 Tree mix 1 (Pecan, Maple BHR, Oak RVW, american Greenwood, black Rosebud) -    17 Red cedar (juniperus virginia) -    18 Tree mix 2   (white Liang, river/red Birch, black [place]wood, common [place], american El) -    19 Box  elder/Maple mix (acer spp) -    20 [place] shagbark (carya ovata) -           Conclusion: no signs for atopy in prick tests      Intradermal Testing (Placed 10/18/21)    No Substance Conc.  Reading (15min)  immediate Papule [mm] / Erythema [mm] Reading   (... days)  delayed Papule [mm] / Erythema [mm] Remarks   DF Standard Dust Mite - D. Farinae 1:10 -   -    DP Standard Dust Mite - D. Pteronyssinus 1:10 + 5/5  -    A Aspergillus fumigatus  1:10 -   -    P Penicillium notatum 1:10 -   -     Dog epithelium 1:10 +/++ 8/9  -    Conclusions: in intradermal tests some immediate type reaction to the dog epithelium and less to one of the house dust mites. We will read on Wednesday if delayed reaction    [] No relevant allergic reaction observed    [x] Allergic reaction diagnosed against following allergens:   Acrylates: +++ 2-Hydroxyethyl Methacrylate (HEMA), +++ Ethyleneglycoldimethacrylate (EGDMA), + Triethylene Glycol Dimethacrylate (TEGDMA), + 1,4-Butandioldimethacrylate (BUDMA)  Additives/disinfectants: + Tert-Butylhydroquinone, + PVP Iodine (irritant?)    Interpretation/ remarks:   Patient has severe sensitization to acrylates, mostly in artificial nails and lacquers and hairspray. Some sensitiation to the antioxidant Butylhydroquinone and well defined reaction over PVP Iodine (irritant?)    [x] Patient information given   [x] [facility] CAMP information's (# [id number], and [id number]) to following compounds: 2-Hydroxyethyl Methacrylate (HEMA),  Ethyleneglycoldimethacrylate (EGDMA), Triethylene Glycol Dimethacrylate (TEGDMA), 1,4-Butandioldimethacrylate (BUDMA),  Tert-Butylhydroquinone, PVP Iodine (irritant?)   [] General information's to following compounds: ......    ________________________________    Assessment & Plan:    ==> Final Diagnosis:     # atopic predisposition with    Seasonal RC in fall    Possible perennial RC and Bronchitis with morning sneezing = dust mites and dog in IDT    Recurrent dermatitis on feet  and trunk with some component of an atopic dermatitis and irritant dermatitis  * chronic illness with exacerbation, progression, side effects from treatment      # suspicion for allergic contact dermatitis on feet and finger and trunk    Very strong contact sensitization to various acrylics in artificial nails, nail polishes/lacquer and maybe also as glue in shoes  --> careful with implant cement for dental and orthopedics --> contain acrylates!!!!!  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 Unlikely relevant drug allergy and Sulfonamides and Penicillins can be given if necessary. Maybe observe patient for 30min after first, initial dose in clinic.  --> Clindamycine: no signs for immediate or delayed type reaction in skin tests, but patient pretty sure about the reaction and is scared. Avoid Clindamycine in future, but if it would be absolutely necessary then Clindamycin can be given under observation  * chronic illness with exacerbation, progression, side effects from treatment    >> explained to patient that there is no direct contra-indication against the COVID vaccines, incl PEG    These conclusions are made at the best of one's knowledge and belief based on the provided evidence such as patient's history and allergy test results and they can change over time or can be incomplete because of missing information's.    ==> Treatment Plan:  - follow the recommendations of CAMP Jose Luis  - be careful with any type of glue or adhesives, particularly on nails  -  if dental implants or reconstructions or orthopedic implants the acrylate allergy is highly relevant and ONLY products should be used with acrylates that did not react (see above)  Patient should do COVID vaccine ASAP     ___________________________      Staff: : provider      Follow-up in Derm-Allergy clinic in about 2 months  ___________________________      I spent a total of 40 minutes with Viridiana Payan during today s  visit. This time was spent discussing all the individual test results, correlating them to the clinical relevance, counseling the patient and/or coordinating care. Moreover time was spent to install and explain the CAMP Jose Luis from [employer] with the informations on the individual allergens and propositions of products that can be used. Please see Assessment and Plan for additional details. Also adjusted and changed the allergy reporting system in Epic

## 2021-10-22 ENCOUNTER — OFFICE VISIT (OUTPATIENT)
Dept: ALLERGY | Facility: CLINIC | Age: 61
End: 2021-10-22
Payer: COMMERCIAL

## 2021-10-22 DIAGNOSIS — Z88.9 ATOPY: ICD-10-CM

## 2021-10-22 DIAGNOSIS — L23.89 ALLERGIC CONTACT DERMATITIS DUE TO OTHER AGENTS: ICD-10-CM

## 2021-10-22 DIAGNOSIS — Z88.9 DRUG ALLERGY: Primary | ICD-10-CM

## 2021-10-22 DIAGNOSIS — L60.3 NAIL DYSTROPHY: ICD-10-CM

## 2021-10-22 PROCEDURE — 99214 OFFICE O/P EST MOD 30 MIN: CPT | Performed by: DERMATOLOGY

## 2021-10-22 NOTE — PATIENT INSTRUCTIONS
[x] Allergic reaction diagnosed against following allergens:   Acrylates: +++ 2-Hydroxyethyl Methacrylate (HEMA), +++ Ethyleneglycoldimethacrylate (EGDMA), + Triethylene Glycol Dimethacrylate (TEGDMA), + 1,4-Butandioldimethacrylate (BUDMA)  Additives/disinfectants: + Tert-Butylhydroquinone, + PVP Iodine (irritant?)    Interpretation/ remarks:   Patient has severe sensitization to acrylates, mostly in artificial nails and lacquers and hairspray. Some sensitiation to the antioxidant Butylhydroquinone and well defined reaction over PVP Iodine (irritant?)    [x] Patient information given   [x] [facility] (# [id number], and [id number]) to following compounds: 2-Hydroxyethyl Methacrylate (HEMA),  Ethyleneglycoldimethacrylate (EGDMA), Triethylene Glycol Dimethacrylate (TEGDMA), 1,4-Butandioldimethacrylate (BUDMA),  Tert-Butylhydroquinone, PVP Iodine (irritant?)

## 2021-10-22 NOTE — NURSING NOTE
Chief Complaint   Patient presents with     Allergy Testing Followup     Patch testing day 5      Lora Tobar RN

## 2021-10-22 NOTE — LETTER
10/22/2021         RE: Viridiana Payan  [address] [address] [place] [address]        Dear Colleague,    Thank you for referring your patient, Viridiana Payan, to the [facility] [place]. Please see a copy of my visit note below.    Formerly [facility] Dermato-allergology Note  Office visit  Encounter Date: Oct 22, 2021  ____________________________________________    CC: No chief complaint on file.      HPI:  (10/20/21)  Ms. Viridiana Payan is a(n) 61 year old female who presents today as a return patient for allergy consultation  - Follow-up in Derm-Allergy clinic for 2nd readings and final conclusions after 4 days (in person)   - otherwise feeling well in usual state of health    Physical exam:  General: In no acute distress, well-developed, well-nourished  Eyes: no conjunctivitis  ENT: no signs of rhinitis   Pulmonary: no wheezing or coughing  Skin:Focused examination of the skin on test sites was performed = see test results below    Earlier History and Allergy exams:    - Pruritic rash March 2021, back, feet, hands d/t new polyester comforter per patient. ~3 days. Patient has photos of rash,   - Seasonal allergies for >30 years, remote history of chronic bronchitis   - Seen by Dr. Santo 6/1/2021 and negative blood test   - Not on any allergy treatments. Used Claritin and Flonase in the past >5 years. No longer using them. Start in late summer, end mid-fall. Runny nose and congestion coughing. Worse at night.   - Chronic onychomycosis - fingers and toenails. Notes many fungal infections. Cultures were negative per visit with [facility]. Took po fluconazole for 3 years that did not clear it + nail picking.   - otherwise feeling well in usual state of health  - No history of asthma. No atopic dermatitis. No family history.   - Use all free and clear products. Using pacifica and lancome makeup brands.     >> some seasonal Bronchitis and improvement on Claritine = 20 years  ago sensitization to ragweed, dust, dogs    Skin: seen pictures in phone --> over the toes eczematous lesions dorsal to base and itchy, eczematous lesions on the back ==> worst in March    Past Medical History:   Patient Active Problem List   Diagnosis   (none) - all problems resolved or deleted     Past Medical History:   Diagnosis Date     ADHD (attention deficit hyperactivity disorder)      Allergic rhinitis      Asthma      Chemical dependency (H)     age 16     Depressive disorder      Knee injury     Work comp     Migraine      Smoker 10/2007    1ppd     Suicide attempt (H)     2 times with pills     Uterine fibroids affecting pregnancy        Allergies:  Allergies   Allergen Reactions     Clindamycin Hives     Codeine Itching     Fluticasone Other (See Comments)     Nose bleeds     Latex Other (See Comments)     Skin burning      Penicillins      Propoxyphene      Sulfa Drugs        Medications:  Current Outpatient Medications   Medication     azelastine (ASTELIN) 0.1 % nasal spray     Clindamycin Phosphate 600 MG/4ML SOLN     Ginkgo Biloba Extract 40 MG CAPS     Iodine, Kelp, (KELP PO)     Multiple Vitamins-Minerals (HAIR VITAMINS PO)     Omega-3 Fatty Acids (FISH OIL PO)     triamcinolone (KENALOG) 0.1 % external ointment     TURMERIC PO     Current Facility-Administered Medications   Medication     ampicillin (OMNIPEN) 1 g vial INTRADERMAL     ampicillin (OMNIPEN) 1 g vial INTRADERMAL     penicillin g potassium 1850362 unit vial INTRADERMAL     penicillin g potassium 7152335 unit vial INTRADERMAL     sulfamethoxazole-trimethoprim (BACTRIM) injection 80 mg     sulfamethoxazole-trimethoprim (BACTRIM) injection 80 mg       Social History:  The patient works at home at a [employer] center. Patient has the following hobbies or non-occupational exposure: none.     Family History:  Family History   Problem Relation Age of Onset     Chronic Obstructive Pulmonary Disease Father      Allergies Father         Seasonal      Breast Cancer Mother      Skin Cancer Brother      Rheumatoid Arthritis Maternal Aunt      Heart Disease Paternal Uncle      Bone Cancer Paternal Grandfather        Previous Labs, Allergy Tests, Dermatopathology, Imaging:      Referred By: Karsten Santo MD  [address] [place] [address]     Allergy Tests:    Past Allergy Test    Order for Future Allergy Testing:    [x] Outpatient  [] Inpatient: [place]..../ Bed ....       Skin Atopy (atopic dermatitis) [] Yes   [x] No .........  Contact allergies:   [] Yes   [x] No ..........  Hand eczema:   [] Yes   [] No           Leading hand:   [] R   [] L       [] Ambidextrous         Drug allergies:        [] Yes   [x] No  Which?.Hives to Penicillins and Clindamycin    Urticaria/Angioedema  [] Yes   [x] No .........  Food Allergy:  [] Yes   [x] No  which?......  Pets :  [x] Yes   [] No  Which?..[place] dog         [x]  Rhinitis   [x] Conjunctivitis   [] Sinusitis   [] Polyposis   [] Otitis   [] Pharyngitis         []  none  Operations:   [] Tonsils   [] Septum   [] Sinus   [] Polyposis        [x] Asthma bronchiale/Bronchitis   [] Coughing      []  none  Symptoms (mostly Rhinoconjunctivitis and Asthma) aggravated by:  Season   [] I   [] II   [] III   [] IV   []V   []VI   []VII   []VIII   [x]IX   [x]X   []XI   []XI     [x] perennial Bronchitis  Day time      [] morning   [] noon      [] evening        [] night    [] whole day........  []  none  Location/changes    [] inside        [] outside   [] mountains    [] sea     [] others.............   []  none  Triggers, specific     [] animals     [] plants     [] dust              [] others ...........................    []  none  Triggers, others       [] work          [] psyche    [] sport            [] others .............................  []  none  Irritant                [] phys efforts [] smoke    [] heat/cold     [] odors  []others............... []  none    Order for PATCH TESTS  Reason for tests (suspected allergy):  recurrent dermatitis  Known previous allergies: none  Standardized panels  [x] Standard panel (40 tests)  [x] Preservatives & Antimicrobials (31 tests)  [x] Emulsifiers & Additives (25 tests)   [x] Perfumes/Flavours & Plants (25 tests)  [] Hairdresser panel (12 tests)  [x] Rubber Chemicals (22 tests)  [x] Plastics (26 tests)  [] Colorants/Dyes/Food additives (20 tests)  [] Metals (implants/dental) (24 tests)  [] Local anaesthetics/NSAIDs (13 tests)  [x] Antibiotics & Antimycotics (14 tests)   [] Corticosteroids (15 tests)   [] Photopatch test (62 tests)   [] others: ...      [] Patient's own products: ...    DO NOT test if chemical or biological identity is unknown!     always ask from patient the product information and safety sheets (MSDS)       Order for PRICK TESTS    Reason for tests (suspected allergy): seasonal and probably perennial Rhinitis and Bronchitis  Known previous allergies: HDM and ragweed    Standardized prick panels  [x] Atopic panel (20 tests)  [] Pediatric Panel (12 tests)  [] Milk, Meat, Eggs, Grains (20 tests)   [] Dust, Epithelia, Feathers (10 tests)  [] Fish, Seafood, Shellfish (17 tests)  [] Nuts, Beans (14 tests)  [] Spice, Vegetable, Fruit (17 tests)  [] Pollen Panel = Tree, Grass, Weed (24 tests)  [] Others: ...      [] Patient's own products: ...    DO NOT test if chemical or biological identity is unknown!     always ask from patient the product information and safety sheets (MSDS)     Standardized intradermal tests  [x] Penicillium notatum [x] Aspergillus fumigatus [x] House dust mites D.far & D. pteron  [] Cat    [x] dog  [] Others: ...  [] Bee venom   [] Wasp venom  !!Specific protocol with dilutions!!       Order for Drug allergy tests (prick & Intradermal &  patch tests)    [x] Penicillin G  [x] Ampicillin [] Cefazolin   [] Ceftriaxone   [] Ceftazidime  [x] Bactrim    [x] Others: Clindamycine  Order for ... as test date      RESULTS & EVALUATION of PATCH TESTS    Patch test  readings after     [x] 2 days, [] 3 days [x] 4 days, [] 5 days,   Other duration: ...    10/18/21 application of patch tests with results:    STANDARD Series        # Substance 2 days 4 days remarks    1 Fahad Mix [C] - -     2 Colophony - -     3  2-Mercaptobenzothiazole  - -      4 Methylisothiazolinone - -     5 Carba Mix - -     6 Thiuram Mix [A] - -     7 Bisphenol A Epoxy Resin - -     8 X-Dzbk-Eemygpzfxxv-Formaldehyde Resin NA NA     9 Mercapto Mix [A] - -     10 Black Rubber Mix- PPD [B] - -     11 Potassium Dichromate  -  -     12 Balsam of [place] (Myroxylon Pereirae Resin) - -     13 Nickel Sulphate Hexahydrate - -     14 Mixed Dialkyl Thiourea - -     15 Paraben Mix [B] - -     16 Methyldibromo Glutaronitrile - -     17 Fragrance Mix - -     18 2-Bromo-2-Nitropropane-1,3-Diol (Bronopol) CT - -     19 Lyral - -     20 Tixocortol-21- Pivalate CT - -     21 Diazolidiyl Urea (Germall II) - -     22 Methyl Methacrylate - -     23 Cobalt (II) Chloride Hexahydrate - -     24 Fragrance Mix II  - -     25 Compositae Mix - -     26 Benzoyl Peroxide - -     27 Bacitracin - -     28 Formaldehyde - -     29 Methylchloroisothiazolinone / Methylisothiazolinone - -     30 Corticosteroid Mix CT NA NA     31 Sodium Lauryl Sulfate - -     32 Lanolin Alcohol - -     33 Turpentine - -     34 Cetylstearylalcohol - -     35 Chlorhexidine Dicluconate + - Or pustule    36 Budenoside NA NA     37 Imidazolidinyl Urea  NA NA     38 Ethyl-2 Cyanoacrylate NA NA     39 Quaternium 15 (Dowicil 200) - -     40 Decyl Glucoside - -     PRESERVATIVES & ANTIMICROBIALS        # Substance 2 days 4 days remarks   41 1  1,2-Benzisothiazoline-3-One, Sodium Salt - -    42 2  1,3,5-Alok (2-Hydroxyethyl) - Hexahydrotriazine (Grotan BK) - -    43 3 8-Whgyomwxbfvta-8-Nitro-1, 3-Propanediol NA NA    44 4  3, 4, 4' - Triclocarban - -    45 5 4 - Chloro - 3 - Cresol - -    46 6 4 - Chloro - 4 - Xylenol (PCMX) - -    47 7 7-Ethylbicyclooxazolidine (Bioban  IA4427) - -    48 8 Benzalkonium Chloride CT - -    49 9 Benzyl Alcohol - -    50 10 Cetalkonium Chloride - -    51 11 Cetylpyrimidine Chloride  - -    52 12 Chloroacetamide - -    53 13 DMDM Hydantoin - -    54 14 Glutaraldehyde - -    55 15 Triclosan - -    56 16 Glyoxal Trimeric Dihydrate - -    57 17 Iodopropynyl Butylcarbamate - -    58 18 Octylisothiazoline NA NA    59 19 Bithionol CT - -    60 20 Bioban P 1487 (Nitrobutyl) Morpholine/(Ethylnitro-Trimethylene) Dimorpholine +/++ -    61 21 Phenoxyethanol NA NA    62 22 Phenyl Salicylate - -    63 23 Povidone Iodine - -    64 24 Sodium Benzoate - -    65 25 Sodium Disulfite NA NA    66 26 Sorbic Acid - -    67 27 Thimerosal      68 28 Melamine Formaldehyde Resin      69 29 Ethylenediamine Dihydrochloride        Parabens      70 30 Butyl-P-Hydroxybenzoate NA NA    71 31 Ethyl-P-Hydroxybenzoate - -    72 32 Methyl-P-Hydroxybenzoate - -    73 33 Propyl-P-Hydroxybenzoate - -     EMULSIFIERS & ADDITIVES       # Substance 2 days 4 days remarks   74 1 Polyethylene Glycol-400 - -    75 2 Cocamidopropyl Betaine - -    76 3 Amerchol L101 - -    77 4 Propylene Glycol - -    78 5 Triethanolamine - -    79 6 Sorbitane Sesquiolate CT - -    80 7 Isopropylmyristate - -    81 8 Polysorbate 80 CT - -    82 9 Amidoamine   (Stearamidopropyl Dimethylamine) - -    83 10 Oleamidopropyl Dimethylamine - -    84 11 Lauryl Glucoside NA NA    85 12 Coconut Diethanolamide  - -    86 13 2-Hydroxy-4-Methoxy Benzophenone (Oxybenzone) - -    87 14 Benzophenone-4 (Sulisobenzon) NA NA    88 15 Propolis - -    89 16 Dexpanthenol NA NA    90 17 Carboxymethyl Cellulose Sodium NA NA    91 18 Abitol - -    92 19 Tert-Butylhydroquinone - +    93 20 Benzyl Salicylate - -    94 21 Dimethylaminopropylamin (DMPA) CT? D053      95 22 Zinc Pyrithione (Zinc Omadine) CT? Z006      96 23 Alok(Hydroxymethyl) Nitromethane CT        Antioxidant - -    97 24 Dodecyl Gallate - -    98 25 Butylhydroxyanisole (BHA) -  -    99 26 Butylhydroxytoluene (BHT) - -    100 27 Di-Alpha-Tocopherol (Vit E) - -    101 28 Propyl Gallate - -     PERFUMES, FLAVORS & PLANTS        # Substance 2 days 4 days remarks   102 1 Benzyl Cinnamate NA NA    103 2 Di-Limonene (Dipentene) - -    104 3 Cananga Odorata (Collin Polanco) (I) - -    105 4 Lichen Acid Mix - -    106 5 Mentha Piperita Oil (Peppermint Oil) - -    107 6 Sesquiterpenelactone mix - -    108 7 Tea Tree Oil, Oxidized - -    109 8 Wood Tar Mix + -    110 9 Abietic Acid - -    111 10 Lavendula Angustifolia Oil (Lavender Oil) - -    112 11 Fragrance mix II CT * - -      Fragrance Mix I      113 12 [place] Absolute + -    114 13 Eugenol - -    115 14 Geraniol - -    116 15 Hydroxycitronellal - -    117 16 Isoeugenol - -    118 17 Cinnamic Aldehyde - -    119 18 Cinnamic Alcohol  - -      Fragrance mix II      120 19 Citronellol - -    121 20 Alpha-Hexylcinnamic Aldehyde    - -    122 21 Citral NA NA    123 22 Farnesol - -    124 23 Coumarin - -      Hexylcinnamic aldehyde, Coumarin, Farnesol, Hydroxyisohexy3-cyclohexene carboxaldehyde, citral, citrolellol   RUBBER CHEMICALS        # Substance 2 days 4 days remarks     Carbamate      125 1 Zinc Bis ( Diethyldithio carbamate ) (ZDEC) - -     2 Zinc Bis (Dibutyldithiocarbamate) - -     3 1,3-Diphenylguanidine (DPG) - -      Thiourea       4 Dibutylthiourea - -     5 Diphenyltiourea - -    130 6 Thiourea NA NA      Mercapto chemicals       7 Morpholinyl Mercaptobenzothiazole - -     8 L-Yooymkbqyl-0-Benzothiazyl-Sulfenamide - -     9 Dibenzothiazyl Disulfide - -      Thiuram chemicals       10 Dipentamethylenethiuram Disulfide - -    135 11 Tetraethylthiuram Disulfide (Disulfiram) - -     12 Tetramethylthiuram Disulfide - -     13 Tetramethyl Thiuram Monosulfide (TMTM) - -      4-Phenylenediamine derivatives       14 N-Isopropyl-N'-Phenyl-P-Phenylenediamine (IPPD) - -     15 Gkdtdrii-H-Zhziebioowczzxei (DPPD) - -      Various Rubber Additives       140 16 Hydroquinone Monobenzylether  - -     17 Hexamethylenetetramine - -     18 4,4'-Dihydroxybiphenyl - -     19 Cyclohexylthiophtalimide - -     20 N-Phenyl-B-Naphthylamine - -    145 21 Dodecyl Mercaptan - -      PLASTICS        # Substance 2 days 4 days remarks     Acrylates - -    146 1 2-Hydroxyethyl Methacrylate (HEMA) +++ +++     2 1,4-Butandioldimethacrylate (BUDMA) - +     3  2-Ethylhexyl Acrylate - -     4 Bisphenol-A-Dimethacrylate  - -    150 5 Diurethane-Dimethacrylate NA NA     6 Ethyleneglycoldimethacrylate (EGDMA) +++ +++     7 Pentaerythritoltriacrylate (TYLER) - -     8 Triethylene Glycol Dimethacrylate (TEGDMA) + +      Synthetic material/additives        9 F-Qfrz-Scfolxbxnzr - -    155 10 Tricresyl Phosphate NA NA     11 5-Uehu-Svwjmwmzdypjn - -     12 Bis (2-Ethylhexyl) Phthalate - -     13 Dibutylphthalate - -     14 Dimethylphthalate - -    160 15 Toluene-2,4-Diisocyanate NA NA     16 Diphenylmethane-4,4''-Diisocyanate - -      EPOXY RESIN SYSTEMS        Reactive Solvents - -     17 Cresyl Glycidyl Ether - -     18 Butyl Glycidyl Ether - -     19 Phenyl Glycidyl Ether - -    165 20 1,4-Butanediol Diglycidyl Ether - -     21 1,6-Hexanediole Diglycidyl Ether - -      Hardener / Accelerator - -     22 Triethylenetetramine - -     23 Diethylenetriamine - -     24 Isophorone Diamine (IPD) - -    170 25 N,N-Dimethyl-P-Toluidine - -     26 0-nujw-Uamxfzogonjhp (antioxidant/stabilizer) CT - -    172 27 1-atgo-Knpuljkmoqqrzfpnaamaxqn resin PTBP CT  - -    ANTIBIOTICS & ANTIMYCOTICS    # Substance 2 days 4 days remarks   173 1 Erythromycine - -     2 Framycetine Sulphate - -    175 3 Fusidic Acid Sodium Salt - -     4 Gentamicin Sulphate - -     5 Neomycine Sulphate - -     6 Oxytetracycline  - -     7 Polymyxin B Sulphate - -    180 8 Tetracycline-HCL - -     9 Sulfanilamide - -     10 Metronidazole - -     11 Oxyquinoline Mix - -     12 Nitrofurazone - -    185 13 Nystatin - -    186 14  Clotrimazole - -      Results of patch tests:                         Interpretation:  - Negative                    A    = Allergic      (+) Erythema    TI   = Toxic/irritant   + E + Infiltration    RaP = Relevance at Present     ++ E/I + Papulovesicle   Rpr  = Relevance Previously     +++ E/I/P + Blister     nR   = No Relevance    DRUG ALLERGY TEST SERIES 10/20/2021        Prick Tests         Substance/ Allergen Conc Result (20 min) Remarks    Histamine Hydrochloride (ALK) 0.1 mg/ml +     NaCl 0.9% -     Benzylpenicillin (Penicillin G) 1 [place] IU/ml (600 mg) -     Ampicillin 100mg/ml -     Clindamycin 150 mg/ml -     Bactrim 80/400 mg/ml -       Intradermal Tests   immed immed delay delay      Substance Conc 1st dil  2nd dil  days  days remarks    NaCl 0.9%         Benzylpenicillin (Penicillin G) 1:100 -        Ampicillin 1:10 -        Clindamycin 1:10 -        Bactrim 1:100 -           Patch Tests  as is as is 1:2 1:2     As Is  Vas Substance Conc 2 days 4 days 2 days 4 days remarks     Benzylpenicillin (Penicillin G) 1 [place] IU/ml (600 mg) - - - -      Ampicillin 100 mg/ml - - - -      Clindamycin 150 mg/ml - - - -      Bactrim 80/400 mg/ml - - - -        Atopy Screen (Placed 10/18/21)    No Substance Readings (15 min) Evaluation   POS Histamine 1mg/ml ++    NEG NaCl 0.9% -      No Substance Readings (15 min) Evaluation   1 Alternaria alternata (tenuis)  -    2 Cladosporium herbarum -    3 Aspergillus fumigatus -    4 Penicillium notatum -    5 Dermatophagoides pteronyssinus -    6 Dermatophagoides farinae -    7 Dog epithelium (canis spp) -    8 Cat hair (mustapha catus) -    9 Cockroach   (Blatella americana & germanica) -    10 Grass mix midwest   (Carina, Orchard, Redtop, Rusty) -    11 Dez grass (sorghum halepense) -    12 Weed mix   (common Cocklebur, Lamb s quarters, rough redroot Pigweed, Dock/Sorrel) -    13 Mug wort (artemisia vulgare) -    14 Ragweed giant/short (ambrosia spp) -    15 White birch (Betula  papyrifera) -    16 Tree mix 1 (Pecan, Maple BHR, Oak RVW, american [place], black [place]) -    17 Red cedar (juniperus virginia) -    18 Tree mix 2   (white Liang, river/red Birch, black [place], common [place], american Elm) -    19 Box elder/Maple mix (acer spp) -    20 [place]ell shagbark (carya ovata) -           Conclusion: no signs for atopy in prick tests      Intradermal Testing (Placed 10/18/21)    No Substance Conc.  Reading (15min)  immediate Papule [mm] / Erythema [mm] Reading   (... days)  delayed Papule [mm] / Erythema [mm] Remarks   DF Standard Dust Mite - D. Farinae 1:10 -   -    DP Standard Dust Mite - D. Pteronyssinus 1:10 + 5/5  -    A Aspergillus fumigatus  1:10 -   -    P Penicillium notatum 1:10 -   -     Dog epithelium 1:10 +/++ 8/9  -    Conclusions: in intradermal tests some immediate type reaction to the dog epithelium and less to one of the house dust mites. We will read on Wednesday if delayed reaction    [] No relevant allergic reaction observed    [x] Allergic reaction diagnosed against following allergens:   Acrylates: +++ 2-Hydroxyethyl Methacrylate (HEMA), +++ Ethyleneglycoldimethacrylate (EGDMA), + Triethylene Glycol Dimethacrylate (TEGDMA), + 1,4-Butandioldimethacrylate (BUDMA)  Additives/disinfectants: + Tert-Butylhydroquinone, + PVP Iodine (irritant?)    Interpretation/ remarks:   Patient has severe sensitization to acrylates, mostly in artificial nails and lacquers and hairspray. Some sensitiation to the antioxidant Butylhydroquinone and well defined reaction over PVP Iodine (irritant?)    [x] Patient information given   [x] ACDS CAMP information's (# [id number], and [id number]) to following compounds: 2-Hydroxyethyl Methacrylate (HEMA),  Ethyleneglycoldimethacrylate (EGDMA), Triethylene Glycol Dimethacrylate (TEGDMA), 1,4-Butandioldimethacrylate (BUDMA),  Tert-Butylhydroquinone, PVP Iodine (irritant?)   [] General information's to following compounds:  ......    ________________________________    Assessment & Plan:    ==> Final Diagnosis:     # atopic predisposition with    Seasonal RC in fall    Possible perennial RC and Bronchitis with morning sneezing = dust mites and dog in IDT    Recurrent dermatitis on feet and trunk with some component of an atopic dermatitis and irritant dermatitis  * chronic illness with exacerbation, progression, side effects from treatment      # suspicion for allergic contact dermatitis on feet and finger and trunk    Very strong contact sensitization to various acrylics in artificial nails, nail polishes/lacquer and maybe also as glue in shoes  --> careful with implant cement for dental and orthopedics --> contain acrylates!!!!!  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 Unlikely relevant drug allergy and Sulfonamides and Penicillins can be given if necessary. Maybe observe patient for 30min after first, initial dose in clinic.  --> Clindamycine: no signs for immediate or delayed type reaction in skin tests, but patient pretty sure about the reaction and is scared. Avoid Clindamycine in future, but if it would be absolutely necessary then Clindamycin can be given under observation  * chronic illness with exacerbation, progression, side effects from treatment    >> explained to patient that there is no direct contra-indication against the COVID vaccines, incl PEG    These conclusions are made at the best of one's knowledge and belief based on the provided evidence such as  patient's history and allergy test results and they can change over time or can be incomplete because of missing information's.    ==> Treatment Plan:  - follow the recommendations of CAMP Jose Luis  - be careful with any type of glue or adhesives, particularly on nails  - if dental implants or reconstructions or orthopedic implants the acrylate allergy is highly relevant and ONLY products should be used with acrylates that did not react (see above)  Patient should do COVID vaccine ASAP     ___________________________      Staff: : provider      Follow-up in Derm-Allergy clinic in about 2 months  ___________________________      I spent a total of 40 minutes with Viridiana Payan during today s  visit. This time was spent discussing all the individual test results, correlating them to the clinical relevance, counseling the patient and/or coordinating care. Moreover time was spent to install and explain the CAMP Jose Luis from [employer] with the informations on the individual allergens and propositions of products that can be used. Please see Assessment and Plan for additional details. Also adjusted and changed the allergy reporting system in Epic          Again, thank you for allowing me to participate in the care of your patient.        Sincerely,        Christian Posey MD

## 2022-08-13 ENCOUNTER — HEALTH MAINTENANCE LETTER (OUTPATIENT)
Age: 62
End: 2022-08-13

## 2022-10-29 ENCOUNTER — HEALTH MAINTENANCE LETTER (OUTPATIENT)
Age: 62
End: 2022-10-29

## 2023-08-18 ENCOUNTER — MYC MEDICAL ADVICE (OUTPATIENT)
Dept: ALLERGY | Facility: CLINIC | Age: 63
End: 2023-08-18
Payer: COMMERCIAL

## 2023-08-18 NOTE — LETTER
AUTHORIZATION FOR ADMINISTRATION OF MEDICATION AT SCHOOL      Patient:  Viridiana Payan    YOB: 1960    Viridiana underwent allergy testing in our clinic. Results determine very strong contact sensitization to plastics/acrylates and additives. This results in recurrent and ongoing issues for her when she comes into contact with these substances, which may be found in the office.     Please allow her to avoid coming in contact with these substances.     Electronically Signed By  Provider: YURI COBOS                                                                                             Date: August 23, 2023

## 2023-09-03 ENCOUNTER — HEALTH MAINTENANCE LETTER (OUTPATIENT)
Age: 63
End: 2023-09-03

## 2023-11-12 ENCOUNTER — HEALTH MAINTENANCE LETTER (OUTPATIENT)
Age: 63
End: 2023-11-12

## (undated) RX ORDER — PENICILLIN G POTASSIUM 5000000 [IU]/1
INJECTION, POWDER, FOR SOLUTION INTRAMUSCULAR; INTRAVENOUS
Status: DISPENSED
Start: 2021-10-20

## (undated) RX ORDER — AMPICILLIN 250 MG/1
INJECTION, POWDER, FOR SOLUTION INTRAMUSCULAR; INTRAVENOUS
Status: DISPENSED
Start: 2021-10-20

## (undated) RX ORDER — SULFAMETHOXAZOLE AND TRIMETHOPRIM 80; 16 MG/ML; MG/ML
INJECTION INTRAVENOUS
Status: DISPENSED
Start: 2021-10-20

## (undated) RX ORDER — CLINDAMYCIN PHOSPHATE 150 MG/ML
INJECTION, SOLUTION INTRAVENOUS
Status: DISPENSED
Start: 2021-10-20